# Patient Record
Sex: MALE | Race: BLACK OR AFRICAN AMERICAN | HISPANIC OR LATINO | Employment: FULL TIME | URBAN - METROPOLITAN AREA
[De-identification: names, ages, dates, MRNs, and addresses within clinical notes are randomized per-mention and may not be internally consistent; named-entity substitution may affect disease eponyms.]

---

## 2017-02-20 ENCOUNTER — LAB REQUISITION (OUTPATIENT)
Dept: LAB | Facility: HOSPITAL | Age: 57
End: 2017-02-20
Payer: COMMERCIAL

## 2017-02-20 ENCOUNTER — HOSPITAL ENCOUNTER (OUTPATIENT)
Dept: RADIOLOGY | Facility: CLINIC | Age: 57
Discharge: HOME | End: 2017-02-20
Payer: COMMERCIAL

## 2017-02-20 ENCOUNTER — ALLSCRIPTS OFFICE VISIT (OUTPATIENT)
Dept: OTHER | Facility: OTHER | Age: 57
End: 2017-02-20

## 2017-02-20 DIAGNOSIS — M25.462 EFFUSION OF LEFT KNEE: ICD-10-CM

## 2017-02-20 DIAGNOSIS — M25.571 PAIN IN RIGHT ANKLE: ICD-10-CM

## 2017-02-20 DIAGNOSIS — M25.562 PAIN IN LEFT KNEE: ICD-10-CM

## 2017-02-20 LAB
CRYSTALS SNV QL MICRO: NORMAL
LYMPHOCYTES # SNV MANUAL: 16 %
MONOCYTES NFR SNV MANUAL: 83 %
NEUTROPHILS NFR SNV MANUAL: 1 %
TOTAL CELLS COUNTED SPEC: 100
WBC # FLD MANUAL: 67 /UL

## 2017-02-20 PROCEDURE — 73562 X-RAY EXAM OF KNEE 3: CPT

## 2017-02-20 PROCEDURE — 73560 X-RAY EXAM OF KNEE 1 OR 2: CPT

## 2017-02-20 PROCEDURE — 87070 CULTURE OTHR SPECIMN AEROBIC: CPT | Performed by: PHYSICIAN ASSISTANT

## 2017-02-20 PROCEDURE — 89051 BODY FLUID CELL COUNT: CPT | Performed by: PHYSICIAN ASSISTANT

## 2017-02-20 PROCEDURE — 89060 EXAM SYNOVIAL FLUID CRYSTALS: CPT | Performed by: PHYSICIAN ASSISTANT

## 2017-02-20 PROCEDURE — 87205 SMEAR GRAM STAIN: CPT | Performed by: PHYSICIAN ASSISTANT

## 2017-02-23 LAB
BACTERIA SPEC BFLD CULT: NO GROWTH
GRAM STN SPEC: NORMAL
GRAM STN SPEC: NORMAL

## 2017-02-27 ENCOUNTER — ALLSCRIPTS OFFICE VISIT (OUTPATIENT)
Dept: OTHER | Facility: OTHER | Age: 57
End: 2017-02-27

## 2017-02-27 ENCOUNTER — HOSPITAL ENCOUNTER (OUTPATIENT)
Dept: RADIOLOGY | Facility: CLINIC | Age: 57
Discharge: HOME | End: 2017-02-27
Payer: COMMERCIAL

## 2017-02-27 DIAGNOSIS — M25.571 PAIN IN RIGHT ANKLE: ICD-10-CM

## 2017-02-27 PROCEDURE — 73630 X-RAY EXAM OF FOOT: CPT

## 2017-02-27 PROCEDURE — 73610 X-RAY EXAM OF ANKLE: CPT

## 2018-01-12 VITALS
SYSTOLIC BLOOD PRESSURE: 136 MMHG | WEIGHT: 240 LBS | HEIGHT: 70 IN | HEART RATE: 98 BPM | DIASTOLIC BLOOD PRESSURE: 89 MMHG | BODY MASS INDEX: 34.36 KG/M2

## 2018-01-14 VITALS
HEIGHT: 70 IN | WEIGHT: 240 LBS | HEART RATE: 92 BPM | BODY MASS INDEX: 34.36 KG/M2 | DIASTOLIC BLOOD PRESSURE: 86 MMHG | SYSTOLIC BLOOD PRESSURE: 121 MMHG

## 2021-03-27 ENCOUNTER — IMMUNIZATIONS (OUTPATIENT)
Dept: FAMILY MEDICINE CLINIC | Facility: HOSPITAL | Age: 61
End: 2021-03-27

## 2021-03-27 DIAGNOSIS — Z23 ENCOUNTER FOR IMMUNIZATION: Primary | ICD-10-CM

## 2021-03-27 PROCEDURE — 0001A SARS-COV-2 / COVID-19 MRNA VACCINE (PFIZER-BIONTECH) 30 MCG: CPT

## 2021-03-27 PROCEDURE — 91300 SARS-COV-2 / COVID-19 MRNA VACCINE (PFIZER-BIONTECH) 30 MCG: CPT

## 2021-04-17 ENCOUNTER — IMMUNIZATIONS (OUTPATIENT)
Dept: FAMILY MEDICINE CLINIC | Facility: HOSPITAL | Age: 61
End: 2021-04-17

## 2021-04-17 DIAGNOSIS — Z23 ENCOUNTER FOR IMMUNIZATION: Primary | ICD-10-CM

## 2021-04-17 PROCEDURE — 91300 SARS-COV-2 / COVID-19 MRNA VACCINE (PFIZER-BIONTECH) 30 MCG: CPT

## 2021-04-17 PROCEDURE — 0002A SARS-COV-2 / COVID-19 MRNA VACCINE (PFIZER-BIONTECH) 30 MCG: CPT

## 2021-10-24 ENCOUNTER — HOSPITAL ENCOUNTER (EMERGENCY)
Facility: HOSPITAL | Age: 61
Discharge: HOME | End: 2021-10-24
Attending: EMERGENCY MEDICINE
Payer: COMMERCIAL

## 2021-10-24 ENCOUNTER — APPOINTMENT (EMERGENCY)
Dept: RADIOLOGY | Facility: HOSPITAL | Age: 61
End: 2021-10-24
Payer: COMMERCIAL

## 2021-10-24 VITALS
BODY MASS INDEX: 35.07 KG/M2 | SYSTOLIC BLOOD PRESSURE: 138 MMHG | HEIGHT: 70 IN | DIASTOLIC BLOOD PRESSURE: 81 MMHG | WEIGHT: 245 LBS | TEMPERATURE: 98.4 F | HEART RATE: 82 BPM | OXYGEN SATURATION: 98 % | RESPIRATION RATE: 18 BRPM

## 2021-10-24 DIAGNOSIS — M54.50 ACUTE LEFT-SIDED LOW BACK PAIN WITHOUT SCIATICA: Primary | ICD-10-CM

## 2021-10-24 LAB
BILIRUB UR QL STRIP: NEGATIVE
CLARITY UR: CLEAR
COLOR UR: YELLOW
GLUCOSE UR STRIP-MCNC: NEGATIVE MG/DL
HGB UR QL STRIP.AUTO: NEGATIVE
KETONES UR STRIP-MCNC: ABNORMAL MG/DL
LEUKOCYTE ESTERASE UR QL STRIP: NEGATIVE
NITRITE UR QL STRIP: NEGATIVE
PH UR STRIP.AUTO: 5.5 [PH] (ref 4.5–8)
PROT UR STRIP-MCNC: NEGATIVE MG/DL
SP GR UR STRIP.AUTO: >=1.03 (ref 1–1.03)
UROBILINOGEN UR QL STRIP.AUTO: 1 E.U./DL

## 2021-10-24 PROCEDURE — 99284 EMERGENCY DEPT VISIT MOD MDM: CPT | Performed by: EMERGENCY MEDICINE

## 2021-10-24 PROCEDURE — 96372 THER/PROPH/DIAG INJ SC/IM: CPT

## 2021-10-24 PROCEDURE — 99284 EMERGENCY DEPT VISIT MOD MDM: CPT

## 2021-10-24 PROCEDURE — 81003 URINALYSIS AUTO W/O SCOPE: CPT

## 2021-10-24 PROCEDURE — 72100 X-RAY EXAM L-S SPINE 2/3 VWS: CPT

## 2021-10-24 RX ORDER — KETOROLAC TROMETHAMINE 30 MG/ML
15 INJECTION, SOLUTION INTRAMUSCULAR; INTRAVENOUS ONCE
Status: COMPLETED | OUTPATIENT
Start: 2021-10-24 | End: 2021-10-24

## 2021-10-24 RX ORDER — NAPROXEN 500 MG/1
500 TABLET ORAL 2 TIMES DAILY PRN
Qty: 20 TABLET | Refills: 0 | Status: SHIPPED | OUTPATIENT
Start: 2021-10-24

## 2021-10-24 RX ADMIN — KETOROLAC TROMETHAMINE 15 MG: 30 INJECTION, SOLUTION INTRAMUSCULAR at 20:18

## 2021-11-11 ENCOUNTER — HOSPITAL ENCOUNTER (OUTPATIENT)
Dept: ULTRASOUND IMAGING | Facility: HOSPITAL | Age: 61
Discharge: HOME | End: 2021-11-11
Payer: COMMERCIAL

## 2021-11-11 DIAGNOSIS — N20.0 RENAL CALCULI: ICD-10-CM

## 2021-11-11 PROCEDURE — 76770 US EXAM ABDO BACK WALL COMP: CPT

## 2022-11-20 ENCOUNTER — APPOINTMENT (EMERGENCY)
Dept: VASCULAR ULTRASOUND | Facility: HOSPITAL | Age: 62
End: 2022-11-20

## 2022-11-20 ENCOUNTER — APPOINTMENT (EMERGENCY)
Dept: RADIOLOGY | Facility: HOSPITAL | Age: 62
End: 2022-11-20

## 2022-11-20 ENCOUNTER — HOSPITAL ENCOUNTER (EMERGENCY)
Facility: HOSPITAL | Age: 62
Discharge: HOME | End: 2022-11-20
Attending: EMERGENCY MEDICINE

## 2022-11-20 VITALS
HEART RATE: 76 BPM | RESPIRATION RATE: 18 BRPM | DIASTOLIC BLOOD PRESSURE: 63 MMHG | SYSTOLIC BLOOD PRESSURE: 123 MMHG | OXYGEN SATURATION: 99 % | TEMPERATURE: 98.9 F | HEIGHT: 70 IN | WEIGHT: 245 LBS | BODY MASS INDEX: 35.07 KG/M2

## 2022-11-20 DIAGNOSIS — M25.562 ACUTE PAIN OF LEFT KNEE: ICD-10-CM

## 2022-11-20 DIAGNOSIS — S83.92XA LEFT KNEE SPRAIN: Primary | ICD-10-CM

## 2022-11-20 LAB
ALBUMIN SERPL BCP-MCNC: 4.4 G/DL (ref 3.5–5)
ALP SERPL-CCNC: 109 U/L (ref 34–104)
ALT SERPL W P-5'-P-CCNC: 21 U/L (ref 7–52)
ANION GAP SERPL CALCULATED.3IONS-SCNC: 10 MMOL/L (ref 4–13)
APTT PPP: 27 SECONDS (ref 23–37)
AST SERPL W P-5'-P-CCNC: 14 U/L (ref 13–39)
BASOPHILS # BLD AUTO: 0.01 THOUSANDS/ÂΜL (ref 0–0.1)
BASOPHILS NFR BLD AUTO: 0 % (ref 0–1)
BILIRUB SERPL-MCNC: 0.37 MG/DL (ref 0.2–1)
BUN SERPL-MCNC: 18 MG/DL (ref 5–25)
CALCIUM SERPL-MCNC: 9.5 MG/DL (ref 8.4–10.2)
CHLORIDE SERPL-SCNC: 102 MMOL/L (ref 96–108)
CK SERPL-CCNC: 141 U/L (ref 39–308)
CO2 SERPL-SCNC: 24 MMOL/L (ref 21–32)
CREAT SERPL-MCNC: 0.87 MG/DL (ref 0.6–1.3)
D DIMER PPP FEU-MCNC: 0.55 UG/ML FEU
EOSINOPHIL # BLD AUTO: 0.13 THOUSAND/ÂΜL (ref 0–0.61)
EOSINOPHIL NFR BLD AUTO: 2 % (ref 0–6)
ERYTHROCYTE [DISTWIDTH] IN BLOOD BY AUTOMATED COUNT: 13.4 % (ref 11.6–15.1)
GFR SERPL CREATININE-BSD FRML MDRD: 93 ML/MIN/1.73SQ M
GLUCOSE SERPL-MCNC: 200 MG/DL (ref 65–140)
HCT VFR BLD AUTO: 36.5 % (ref 36.5–49.3)
HGB BLD-MCNC: 12.4 G/DL (ref 12–17)
IMM GRANULOCYTES # BLD AUTO: 0.02 THOUSAND/UL (ref 0–0.2)
IMM GRANULOCYTES NFR BLD AUTO: 0 % (ref 0–2)
INR PPP: 1.01 (ref 0.84–1.19)
LYMPHOCYTES # BLD AUTO: 2.14 THOUSANDS/ÂΜL (ref 0.6–4.47)
LYMPHOCYTES NFR BLD AUTO: 33 % (ref 14–44)
MCH RBC QN AUTO: 29.7 PG (ref 26.8–34.3)
MCHC RBC AUTO-ENTMCNC: 34 G/DL (ref 31.4–37.4)
MCV RBC AUTO: 87 FL (ref 82–98)
MONOCYTES # BLD AUTO: 0.48 THOUSAND/ÂΜL (ref 0.17–1.22)
MONOCYTES NFR BLD AUTO: 8 % (ref 4–12)
NEUTROPHILS # BLD AUTO: 3.63 THOUSANDS/ÂΜL (ref 1.85–7.62)
NEUTS SEG NFR BLD AUTO: 57 % (ref 43–75)
NRBC BLD AUTO-RTO: 0 /100 WBCS
PLATELET # BLD AUTO: 286 THOUSANDS/UL (ref 149–390)
PMV BLD AUTO: 8.4 FL (ref 8.9–12.7)
POTASSIUM SERPL-SCNC: 3.6 MMOL/L (ref 3.5–5.3)
PROT SERPL-MCNC: 8.2 G/DL (ref 6.4–8.4)
PROTHROMBIN TIME: 13.5 SECONDS (ref 11.6–14.5)
RBC # BLD AUTO: 4.18 MILLION/UL (ref 3.88–5.62)
SODIUM SERPL-SCNC: 136 MMOL/L (ref 135–147)
URATE SERPL-MCNC: 7.5 MG/DL (ref 3.5–8.5)
WBC # BLD AUTO: 6.41 THOUSAND/UL (ref 4.31–10.16)

## 2022-11-20 RX ORDER — AMLODIPINE BESYLATE 2.5 MG/1
TABLET ORAL
COMMUNITY
Start: 2017-02-20

## 2022-11-20 RX ORDER — OXYCODONE HYDROCHLORIDE AND ACETAMINOPHEN 5; 325 MG/1; MG/1
1 TABLET ORAL EVERY 4 HOURS PRN
Qty: 20 TABLET | Refills: 0 | Status: SHIPPED | OUTPATIENT
Start: 2022-11-20 | End: 2022-11-24

## 2022-11-20 RX ORDER — ACETAMINOPHEN 325 MG/1
975 TABLET ORAL ONCE
Status: COMPLETED | OUTPATIENT
Start: 2022-11-20 | End: 2022-11-20

## 2022-11-20 RX ORDER — ATORVASTATIN CALCIUM 10 MG/1
TABLET, FILM COATED ORAL
COMMUNITY
Start: 2017-02-20

## 2022-11-20 RX ORDER — KETOROLAC TROMETHAMINE 30 MG/ML
15 INJECTION, SOLUTION INTRAMUSCULAR; INTRAVENOUS ONCE
Status: COMPLETED | OUTPATIENT
Start: 2022-11-20 | End: 2022-11-20

## 2022-11-20 RX ORDER — RAMIPRIL 1.25 MG/1
CAPSULE ORAL DAILY
COMMUNITY
Start: 2017-02-20

## 2022-11-20 RX ADMIN — KETOROLAC TROMETHAMINE 15 MG: 30 INJECTION, SOLUTION INTRAMUSCULAR at 02:22

## 2022-11-20 RX ADMIN — ACETAMINOPHEN 975 MG: 325 TABLET ORAL at 02:20

## 2022-11-20 NOTE — ED PROVIDER NOTES
History  Chief Complaint   Patient presents with   • Knee Pain     Patient reports x1 day left knee pain and left leg swelling - reports x2 days of >20hrs flying and increased activity  Taking 1000mg Tylenol w/o relief  63year old male presents for evaluation for worsening of his chronic knee pain  Patient reports history of left knee pain for the past few years  Patient states that yesterday he [place] walking in airports while he was [place] and believes this may have exacerbated his pain  No specific trauma or injuries  Patient states he took two different flights yesterday, both lasting around 3-4 hours each  Patient also believes that left knee is more swollen when compared to the right side  Patient denies fevers, chills, chest pain, shortness of breath, abdominal pain, nausea, vomiting or any other concerns  Patient has no history of venous thromboembolism, no known cancer history, no recent surgeries  Patient is not on anticoagulation  Knee Pain  Associated symptoms: no back pain and no fever        Prior to Admission Medications   Prescriptions Last Dose Informant Patient Reported? Taking? amLODIPine (NORVASC) 2 5 mg tablet   Yes Yes   Sig: Take by mouth   atorvastatin (LIPITOR) 10 mg tablet   Yes Yes   Sig: Take by mouth   ramipril (ALTACE) 1 25 mg capsule   Yes Yes   Sig: Take by mouth in the morning      Facility-Administered Medications: None       Past Medical History:   Diagnosis Date   • Diabetes mellitus ([facility] [address] )    • Hypertension        History reviewed  No pertinent surgical history  History reviewed  No pertinent family history  I have reviewed and agree with the history as documented      E-Cigarette/Vaping     E-Cigarette/Vaping Substances     Social History     Tobacco Use   • Smoking status: Never   • Smokeless tobacco: Never   Substance Use Topics   • Alcohol use: Not Currently   • Drug use: Never        Review of Systems   Constitutional: Negative for chills and fever    HENT: Negative for ear pain and sore throat  Eyes: Negative for pain and visual disturbance  Respiratory: Negative for cough and shortness of breath  Cardiovascular: Negative for chest pain and palpitations  Gastrointestinal: Negative for abdominal pain and vomiting  Genitourinary: Negative for dysuria and hematuria  Musculoskeletal: Negative for arthralgias and back pain  Left knee pain and swelling    Skin: Negative for color change and rash  Neurological: Negative for seizures and syncope  All other systems reviewed and are negative  Physical Exam  ED Triage Vitals [11/20/22 0123]   Temperature Pulse Respirations Blood Pressure SpO2   98 9 °F (37 2 °C) 90 18 (!) 179/92 99 %      Temp Source Heart Rate Source Patient Position - Orthostatic VS BP Location FiO2 (%)   Oral Monitor Sitting Right arm --      Pain Score       10 - Worst Possible Pain             Orthostatic Vital Signs  Vitals:    11/20/22 0123 11/20/22 0524   BP: (!) 179/92 123/63   Pulse: 90 76   Patient Position - Orthostatic VS: Sitting Lying       Physical Exam  Vitals and nursing note reviewed  Constitutional:       General: He is not in acute distress  Appearance: Normal appearance  He is well-developed  He is not ill-appearing, toxic-appearing or diaphoretic  HENT:      Head: Normocephalic and atraumatic  Mouth/Throat:      Mouth: Mucous membranes are moist    Eyes:      Conjunctiva/sclera: Conjunctivae normal    Cardiovascular:      Rate and Rhythm: Normal rate and regular rhythm  Heart sounds: No murmur heard  Pulmonary:      Effort: Pulmonary effort is normal  No respiratory distress  Breath sounds: Normal breath sounds  Abdominal:      Palpations: Abdomen is soft  Tenderness: There is no abdominal tenderness  Musculoskeletal:         General: No swelling  Cervical back: Neck supple        Comments: No tenderness overlying left knee, no significant swelling or effusion, no overlying skin changes to suggest cellulitis, full passive range of motion but patient reports painful range of motion, no crepitus, neurovascularly intact with 2+ DP pulses, no calf tenderness    Skin:     General: Skin is warm and dry  Capillary Refill: Capillary refill takes less than 2 seconds  Neurological:      Mental Status: He is alert  Psychiatric:         Mood and Affect: Mood normal          ED Medications  Medications   acetaminophen (TYLENOL) tablet 975 mg (975 mg Oral Given 11/20/22 0220)   ketorolac (TORADOL) injection 15 mg (15 mg Intravenous Given 11/20/22 0222)       Diagnostic Studies  Results Reviewed     Procedure Component Value Units Date/Time    D-Dimer [[id number]]  (Abnormal) Collected: 11/20/22 0220    Lab Status: Final result Specimen: Blood from Arm, Left Updated: 11/20/22 0300     D-Dimer, Quant 0 55 ug/ml FEU     Narrative: In the evaluation for possible pulmonary embolism, in the appropriate (Well's Score of 4 or less) patient, the age adjusted d-dimer cutoff for this patient can be calculated as:    Age x 0 01 (in ug/mL) for Age-adjusted D-dimer exclusion threshold for a patient over 50 years      Uric acid [[id number]]  (Normal) Collected: 11/20/22 0220    Lab Status: Final result Specimen: Blood from Arm, Left Updated: 11/20/22 0254     Uric Acid 7 5 mg/dL     CK Total with Reflex CKMB [[id number]]  (Normal) Collected: 11/20/22 0220    Lab Status: Final result Specimen: Blood from Arm, Left Updated: 11/20/22 0253     Total  U/L     Comprehensive metabolic panel [[id number]]  (Abnormal) Collected: 11/20/22 0220    Lab Status: Final result Specimen: Blood from Arm, Left Updated: 11/20/22 0253     Sodium 136 mmol/L      Potassium 3 6 mmol/L      Chloride 102 mmol/L      CO2 24 mmol/L      ANION GAP 10 mmol/L      BUN 18 mg/dL      Creatinine 0 87 mg/dL      Glucose 200 mg/dL      Calcium 9 5 mg/dL      AST 14 U/L      ALT 21 U/L      Alkaline Phosphatase 109 U/L      Total Protein 8 2 g/dL      Albumin 4 4 g/dL      Total Bilirubin 0 37 mg/dL      eGFR 93 ml/min/1 73sq m     Narrative:      National Kidney Disease Foundation guidelines for Chronic Kidney Disease (CKD):   •  Stage 1 with normal or high GFR (GFR > 90 mL/min/1 73 square meters)  •  Stage 2 Mild CKD (GFR = 60-89 mL/min/1 73 square meters)  •  Stage 3A Moderate CKD (GFR = 45-59 mL/min/1 73 square meters)  •  Stage 3B Moderate CKD (GFR = 30-44 mL/min/1 73 square meters)  •  Stage 4 Severe CKD (GFR = 15-29 mL/min/1 73 square meters)  •  Stage 5 End Stage CKD (GFR <15 mL/min/1 73 square meters)  Note: GFR calculation is accurate only with a steady state creatinine    Protime-INR [[id number]]  (Normal) Collected: 11/20/22 0220    Lab Status: Final result Specimen: Blood from Arm, Left Updated: 11/20/22 0246     Protime 13 5 seconds      INR 1 01    APTT [[id number]]  (Normal) Collected: 11/20/22 0220    Lab Status: Final result Specimen: Blood from Arm, Left Updated: 11/20/22 0246     PTT 27 seconds     CBC and differential [[id number]]  (Abnormal) Collected: 11/20/22 0220    Lab Status: Final result Specimen: Blood from Arm, Left Updated: 11/20/22 0230     WBC 6 41 Thousand/uL      RBC 4 18 Million/uL      Hemoglobin 12 4 g/dL      Hematocrit 36 5 %      MCV 87 fL      MCH 29 7 pg      MCHC 34 0 g/dL      RDW 13 4 %      MPV 8 4 fL      Platelets 380 Thousands/uL      nRBC 0 /100 WBCs      Neutrophils Relative 57 %      Immat GRANS % 0 %      Lymphocytes Relative 33 %      Monocytes Relative 8 %      Eosinophils Relative 2 %      Basophils Relative 0 %      Neutrophils Absolute 3 63 Thousands/µL      Immature Grans Absolute 0 02 Thousand/uL      Lymphocytes Absolute 2 14 Thousands/µL      Monocytes Absolute 0 48 Thousand/µL      Eosinophils Absolute 0 13 Thousand/µL      Basophils Absolute 0 01 Thousands/µL                  VAS lower limb venous duplex study, unilateral/limited   ED Interpretation by George Olson, MD (11/20 5437)   Negative for DVT in the left leg as per vascular tech  No evidence of Baker's cyst        XR knee 4+ views left injury   ED Interpretation by Jacklynn Severs, MD (11/20 7570)   No fracture or dislocation            Procedures  Procedures      ED Course  ED Course as of 11/20/22 0804   Sun Nov 20, 2022   0405 Discussed with patient staying until the AM for ultrasound to arrive vs  Scheduling as outpatient  Patient requesting to stay in the ED to have venous duplex obtained in the AM                               SBIRT 20yo+    Flowsheet Row Most Recent Value   SBIRT (25 yo +)    In order to provide better care to our patients, we are screening all of our patients for alcohol and drug use  Would it be okay to ask you these screening questions?  Unable to answer at this time Filed at: 11/20/2022 0125            Leodan' Criteria for DVT    Flowsheet Row Most Recent Value   Leodan' Criteria for DVT    Active cancer Treatment or palliation within 6 months 0 Filed at: 11/20/2022 0602   Bedridden recently >3 days or major surgery within 12 weeks 0 Filed at: 11/20/2022 0602   Calf swelling >3 cm compared to the other leg 0 Filed at: 11/20/2022 0602   Entire leg swollen 0 Filed at: 11/20/2022 0602   Collateral (nonvaricose) superficial veins present 0 Filed at: 11/20/2022 0602   Localized tenderness along the deep venous system 0 Filed at: 11/20/2022 0602   Pitting edema, confined to symptomatic leg 0 Filed at: 11/20/2022 0602   Paralysis, paresis, or recent plaster immobilization of the lower extremity 0 Filed at: 11/20/2022 0602   Previously documented DVT 0 Filed at: 11/20/2022 0602   Alternative diagnosis to DVT as likely or more likely 0 Filed at: 11/20/2022 0602   Leodan DVT Critera Score 0 Filed at: 11/20/2022 0602          MDM  Number of Diagnoses or Management Options  Acute pain of left knee  Left knee sprain  Diagnosis management comments: 63year old male presents for evaluation of worsening left knee pain  Physical exam reassuring  Reviewed lab findings with patient  Recommended ultrasound to rule out DVT given recent air travel  Given that ultrasound is not available overnight, patient preferred to wait until the AM to obtain venous duplex  Ultrasound negative for DVT or Baker's cyst   Patient provided with knee immobilizer and crutches and recommended close ortho follow up  Reviewed return precautions  Amount and/or Complexity of Data Reviewed  Clinical lab tests: ordered and reviewed  Tests in the radiology section of CPT®: reviewed and ordered  Independent visualization of images, tracings, or specimens: yes    Risk of Complications, Morbidity, and/or Mortality  Presenting problems: moderate  Diagnostic procedures: moderate  Management options: low    Patient Progress  Patient progress: stable      Disposition  Final diagnoses:   Left knee sprain   Acute pain of left knee     Time reflects when diagnosis was documented in both MDM as applicable and the Disposition within this note     Time User Action Codes Description Comment    11/20/2022  7:50 AM Wally [place] Add Ora Nations  92XA] Left knee sprain     11/20/2022  7:53 AM Annie Delvalle Add [R27 [id number]] Acute pain of left knee       ED Disposition     ED Disposition   Discharge    Condition   Stable    Date/Time   Sun Nov 20, 2022  7:50 AM    Comment   Driss Hauser discharge to home/self care                 Follow-up Information     Follow up With Specialties Details Why Contact Info Additional [address] [facility] [place] [facility] Schedule an appointment as soon as possible for a visit   [address] [id number] [address] [facility] [place], [address] [facility] [address] [place], [place], [id number] [phone]          Patient's Medications   Discharge Prescriptions    OXYCODONE-ACETAMINOPHEN (PERCOCET) 5-325 MG PER TABLET    Take 1 tablet by mouth every 4 (four) hours as needed for moderate pain for up to 4 days Max Daily Amount: 6 tablets       Start Date: 11/20/2022End Date: 11/24/2022       Order Dose: 1 tablet       Quantity: 20 tablet    Refills: 0     No discharge procedures on file  PDMP Review       Value Time User    PDMP Reviewed  Yes 11/20/2022  1:23 AM Ricarda Fallon MD           ED Provider  Attending physically available and evaluated Driss Hauser  MABLE managed the patient along with the ED Attending      Electronically Signed by         Iris Batista MD  11/20/22 0641

## 2022-11-20 NOTE — ED ATTENDING ATTESTATION
11/20/2022  I, Roxene Goldmann, MD, saw and evaluated the patient  I have discussed the patient with the resident/non-physician practitioner and agree with the resident's/non-physician practitioner's findings, Plan of Care, and MDM as documented in the resident's/non-physician practitioner's note, except where noted  All available labs and Radiology studies were reviewed  I was present for key portions of any procedure(s) performed by the resident/non-physician practitioner and I was immediately available to provide assistance  At this point I agree with the current assessment done in the Emergency Department  I have conducted an independent evaluation of this patient a history and physical is as follows: Patient is a 63year old male with worsening left knee pain since yesterday  No trauma  (+) air travel recently  No fever  No sob  No cough  Was last seen in this ED on 10/24/21 for left lower back pain  [facility] website checked on this patient and no Rx found  NCAT  Mucous membranes moist  Lungs clear  Heart regular without murmur  Abdomen soft and nontender  No edema  No calf tenderness  No significant left knee tenderness (states it has pain with movement)  NVI  DDX including but not limited to: arthritis, bursitis, tendinitis, sprain, strain, fracture, meniscal tear, doubt cellulitis, osteomyelitis; gout, doubt Lyme disease; Baker's cyst, rheumatologic process; doubt septic arthritis or DVT or arterial occlusion  Will check labs and x-ray       ED Course         Critical Care Time  Procedures

## 2022-12-01 ENCOUNTER — APPOINTMENT (OUTPATIENT)
Dept: RADIOLOGY | Facility: AMBULARY SURGERY CENTER | Age: 62
End: 2022-12-01
Attending: ORTHOPAEDIC SURGERY

## 2022-12-01 ENCOUNTER — OFFICE VISIT (OUTPATIENT)
Dept: OBGYN CLINIC | Facility: CLINIC | Age: 62
End: 2022-12-01

## 2022-12-01 VITALS — HEIGHT: 70 IN | BODY MASS INDEX: 35.36 KG/M2 | WEIGHT: 247 LBS

## 2022-12-01 DIAGNOSIS — M17.12 PRIMARY OSTEOARTHRITIS OF LEFT KNEE: Primary | ICD-10-CM

## 2022-12-01 DIAGNOSIS — M25.561 ACUTE PAIN OF RIGHT KNEE: ICD-10-CM

## 2022-12-01 RX ORDER — CLOTRIMAZOLE AND BETAMETHASONE DIPROPIONATE 10; .64 MG/G; MG/G
CREAM TOPICAL
COMMUNITY
Start: 2022-10-13

## 2022-12-01 RX ORDER — NIFEDIPINE 90 MG/1
TABLET, FILM COATED, EXTENDED RELEASE ORAL
COMMUNITY
Start: 2022-10-13

## 2022-12-01 RX ORDER — MELOXICAM 15 MG/1
15 TABLET ORAL DAILY
Qty: 30 TABLET | Refills: 0 | Status: SHIPPED | OUTPATIENT
Start: 2022-12-01

## 2022-12-01 RX ORDER — VALSARTAN AND HYDROCHLOROTHIAZIDE 320; 25 MG/1; MG/1
1 TABLET, FILM COATED ORAL DAILY
COMMUNITY
Start: 2022-10-13

## 2022-12-01 RX ORDER — TRIAMCINOLONE ACETONIDE 40 MG/ML
20 INJECTION, SUSPENSION INTRA-ARTICULAR; INTRAMUSCULAR
Status: COMPLETED | OUTPATIENT
Start: 2022-12-01 | End: 2022-12-01

## 2022-12-01 RX ORDER — METOPROLOL SUCCINATE 100 MG/1
100 TABLET, EXTENDED RELEASE ORAL DAILY
COMMUNITY
Start: 2022-10-13

## 2022-12-01 RX ORDER — ROPIVACAINE HYDROCHLORIDE 5 MG/ML
10 INJECTION, SOLUTION EPIDURAL; INFILTRATION; PERINEURAL
Status: COMPLETED | OUTPATIENT
Start: 2022-12-01 | End: 2022-12-01

## 2022-12-01 RX ADMIN — TRIAMCINOLONE ACETONIDE 20 MG: 40 INJECTION, SUSPENSION INTRA-ARTICULAR; INTRAMUSCULAR at 09:25

## 2022-12-01 RX ADMIN — ROPIVACAINE HYDROCHLORIDE 10 ML: 5 INJECTION, SOLUTION EPIDURAL; INFILTRATION; PERINEURAL at 09:25

## 2022-12-15 ENCOUNTER — PROCEDURE VISIT (OUTPATIENT)
Dept: OBGYN CLINIC | Facility: CLINIC | Age: 62
End: 2022-12-15

## 2022-12-15 VITALS — WEIGHT: 247 LBS | HEIGHT: 70 IN | BODY MASS INDEX: 35.36 KG/M2

## 2022-12-15 DIAGNOSIS — M17.12 PRIMARY OSTEOARTHRITIS OF LEFT KNEE: Primary | ICD-10-CM

## 2022-12-15 NOTE — PROGRESS NOTES
1  Primary osteoarthritis of left knee  Large joint arthrocentesis: L knee        Patient is here for his 1st injection of orthovisc into the left knee  Patient reports left knee pain  Physical exam of the knee shows no effusion no ecchymosis  All other organ systems normal    Large joint arthrocentesis: L knee  Universal Protocol:  Consent given by: patient  Time out: Immediately prior to procedure a "time out" was called to verify the correct patient, procedure, equipment, support staff and site/side marked as required    Site marked: the operative site was marked  Supporting Documentation  Indications: pain   Procedure Details  Location: knee - L knee  Preparation: Patient was prepped and draped in the usual sterile fashion  Needle size: 22 G  Ultrasound guidance: no  Approach: anterolateral  Medications administered: 30 mg sodium hyaluronate 30 mg/2 mL    Patient tolerance: patient tolerated the procedure well with no immediate complications  Dressing:  Sterile dressing applied          Patient tolerated procedure follow up 1 week

## 2022-12-22 ENCOUNTER — PROCEDURE VISIT (OUTPATIENT)
Dept: OBGYN CLINIC | Facility: CLINIC | Age: 62
End: 2022-12-22

## 2022-12-22 VITALS
SYSTOLIC BLOOD PRESSURE: 120 MMHG | BODY MASS INDEX: 35.36 KG/M2 | DIASTOLIC BLOOD PRESSURE: 65 MMHG | WEIGHT: 247 LBS | HEIGHT: 70 IN | HEART RATE: 80 BPM

## 2022-12-22 DIAGNOSIS — M17.12 PRIMARY OSTEOARTHRITIS OF LEFT KNEE: Primary | ICD-10-CM

## 2022-12-22 NOTE — PROGRESS NOTES
1  Primary osteoarthritis of left knee  Large joint arthrocentesis: L knee        Patient is here for his second injection of the orthovisc into the left knee  Patient reports improvement  Physical exam of the knee shows no effusion no ecchymosis  All other organ systems normal    Large joint arthrocentesis: L knee  Universal Protocol:  Consent given by: patient  Time out: Immediately prior to procedure a "time out" was called to verify the correct patient, procedure, equipment, support staff and site/side marked as required    Site marked: the operative site was marked  Supporting Documentation  Indications: pain   Procedure Details  Location: knee - L knee  Preparation: Patient was prepped and draped in the usual sterile fashion  Needle size: 22 G  Ultrasound guidance: no  Approach: anterolateral  Medications administered: 30 mg sodium hyaluronate 30 mg/2 mL    Patient tolerance: patient tolerated the procedure well with no immediate complications  Dressing:  Sterile dressing applied          Patient tolerated procedure follow up 1 week

## 2022-12-29 ENCOUNTER — PROCEDURE VISIT (OUTPATIENT)
Dept: OBGYN CLINIC | Facility: CLINIC | Age: 62
End: 2022-12-29

## 2022-12-29 VITALS
HEIGHT: 70 IN | HEART RATE: 89 BPM | SYSTOLIC BLOOD PRESSURE: 123 MMHG | WEIGHT: 247 LBS | DIASTOLIC BLOOD PRESSURE: 80 MMHG | BODY MASS INDEX: 35.36 KG/M2

## 2022-12-29 DIAGNOSIS — M17.12 PRIMARY OSTEOARTHRITIS OF LEFT KNEE: Primary | ICD-10-CM

## 2022-12-29 RX ORDER — HYALURONATE SODIUM 10 MG/ML
20 SYRINGE (ML) INTRAARTICULAR
Status: DISCONTINUED | OUTPATIENT
Start: 2022-12-29 | End: 2022-12-30

## 2022-12-29 NOTE — PROGRESS NOTES
1  Primary osteoarthritis of left knee  Large joint arthrocentesis: L knee    Sodium Hyaluronate 20 mg        Patient is here for his 3rd injection of orthovisc into the left knee  Patient reports improvements  Physical exam of the knee shows no effusion no ecchymosis  All other organ systems normal    Large joint arthrocentesis: L knee  Universal Protocol:  Consent given by: patient  Time out: Immediately prior to procedure a "time out" was called to verify the correct patient, procedure, equipment, support staff and site/side marked as required    Site marked: the operative site was marked  Supporting Documentation  Indications: pain   Procedure Details  Location: knee - L knee  Preparation: Patient was prepped and draped in the usual sterile fashion  Needle size: 22 G  Ultrasound guidance: no  Approach: anterolateral  Medications administered: 30 mg sodium hyaluronate 30 mg/2 mL    Patient tolerance: patient tolerated the procedure well with no immediate complications  Dressing:  Sterile dressing applied          Patient tolerated procedure follow up PRN

## 2023-03-27 ENCOUNTER — APPOINTMENT (OUTPATIENT)
Dept: RADIOLOGY | Facility: AMBULARY SURGERY CENTER | Age: 63
End: 2023-03-27
Attending: ORTHOPAEDIC SURGERY

## 2023-03-27 ENCOUNTER — TELEPHONE (OUTPATIENT)
Dept: OBGYN CLINIC | Facility: HOSPITAL | Age: 63
End: 2023-03-27

## 2023-03-27 ENCOUNTER — OFFICE VISIT (OUTPATIENT)
Dept: OBGYN CLINIC | Facility: CLINIC | Age: 63
End: 2023-03-27

## 2023-03-27 VITALS
HEART RATE: 80 BPM | DIASTOLIC BLOOD PRESSURE: 83 MMHG | SYSTOLIC BLOOD PRESSURE: 122 MMHG | BODY MASS INDEX: 34.5 KG/M2 | WEIGHT: 241 LBS | HEIGHT: 70 IN

## 2023-03-27 DIAGNOSIS — M19.079 ARTHRITIS OF MIDFOOT: Primary | ICD-10-CM

## 2023-03-27 DIAGNOSIS — M21.42 PES PLANUS OF BOTH FEET: ICD-10-CM

## 2023-03-27 DIAGNOSIS — M79.671 PAIN IN RIGHT FOOT: ICD-10-CM

## 2023-03-27 DIAGNOSIS — M21.41 PES PLANUS OF BOTH FEET: ICD-10-CM

## 2023-03-27 PROBLEM — M21.40 FLAT FOOT: Status: ACTIVE | Noted: 2023-03-27

## 2023-03-27 RX ORDER — METHYLPREDNISOLONE 4 MG/1
TABLET ORAL
Qty: 1 EACH | Refills: 0 | Status: SHIPPED | OUTPATIENT
Start: 2023-03-27

## 2023-03-27 NOTE — TELEPHONE ENCOUNTER
Caller: patient    Doctor: renard    Reason for call: patient called to check for medication that was submitted    Call back#: n/a

## 2023-03-27 NOTE — PROGRESS NOTES
Assessment:  1  Arthritis of midfoot  methylPREDNISolone 4 MG tablet therapy pack    Ambulatory Referral to Physical Therapy      2  Pain in right foot  XR foot 3+ vw right      3  Pes planus of both feet  methylPREDNISolone 4 MG tablet therapy pack    Ambulatory Referral to Physical Therapy        Patient Active Problem List   Diagnosis   • Primary osteoarthritis of left knee   • Arthritis of midfoot   • Flat foot       Plan:    63 y o  male with right foot pain secondary to midfoot arthritis at the articulation of the fifth metatarsal base and cuboid    • Medrol Dosepak prescribed for pain relief  • Referral to [facility] of  for fabrication of custom orthotics  • He also has a bunion on the right foot he will let us know if he desires any intervention for this we can send him to Dr Jules Carrizales  • Follow-up as needed        The patient was seen and examined by Dr Darnell Guerin and myself  The assessment and plan were formulated by Dr Darnell Guerin and I assisted in carrying it out  Subjective:   Patient ID: Irasema Costello is a 63 y o  male   HPI    Patient comes in today with regards to right foot pain  Patient is referred to us by Marie Simmons for further evaluation  The patient reports that the pain been going on for 2 weeks  Injury or trauma prior to onset of pain: None  Pain is located in the dorsal lateral right foot  It is worsened with weightbearing, and is made better with rest   Treatments tried: Tylenol meloxicam The pain does not radiate  Old injuries or prior surgeries: None  Numbness or tingling: Denies        The following portions of the patient's history were reviewed and updated as appropriate: allergies, current medications, past family history, past social history, past surgical history and problem list     Social History     Socioeconomic History   • Marital status: /Civil Union     Spouse name: Not on file   • Number of children: Not on file   • Years of education: Not on file   • Highest education level: Not on file   Occupational History   • Not on file   Tobacco Use   • Smoking status: Never   • Smokeless tobacco: Never   Substance and Sexual Activity   • Alcohol use: Not Currently   • Drug use: Never   • Sexual activity: Not on file   Other Topics Concern   • Not on file   Social History Narrative   • Not on file     Social Determinants of Health     Financial Resource Strain: Not on file   Food Insecurity: Not on file   Transportation Needs: Not on file   Physical Activity: Not on file   Stress: Not on file   Social Connections: Not on file   Intimate Partner Violence: Not on file   Housing Stability: Not on file     Past Medical History:   Diagnosis Date   • Diabetes mellitus ([facility] [place] [address] )    • Hypertension      History reviewed  No pertinent surgical history  No Known Allergies  Current Outpatient Medications on File Prior to Visit   Medication Sig Dispense Refill   • amLODIPine (NORVASC) 2 5 mg tablet Take by mouth     • atorvastatin (LIPITOR) 10 mg tablet Take by mouth     • clotrimazole-betamethasone (LOTRISONE) 1-0 05 % cream APPLY 1 APPLICATION EXTERNALLY TWICE A DAY FOR 90 DAYS     • meloxicam (Mobic) 15 mg tablet Take 1 tablet (15 mg total) by mouth daily 30 tablet 0   • metFORMIN (GLUCOPHAGE) 1000 MG tablet TAKE 1 TABLET WITH A MEAL BY MOUTH TWICE A DAY     • metoprolol succinate (TOPROL-XL) 100 mg 24 hr tablet Take 100 mg by mouth daily     • NIFEdipine (ADALAT CC) 90 mg 24 hr tablet TAKE 1 TABLET BY MOUTH EVERY DAY ON EMPTY STOMACH FOR 90 DAYS     • ramipril (ALTACE) 1 25 mg capsule Take by mouth in the morning     • valsartan-hydrochlorothiazide (DIOVAN-HCT) 320-25 MG per tablet Take 1 tablet by mouth daily       No current facility-administered medications on file prior to visit  Review of Systems   Constitutional: Negative for chills, fever and unexpected weight change  HENT: Negative for hearing loss, nosebleeds and sore throat      Eyes: Negative for pain, redness and visual disturbance  Respiratory: Negative for cough, shortness of breath and wheezing  Cardiovascular: Negative for chest pain, palpitations and leg swelling  Gastrointestinal: Negative for abdominal pain, nausea and vomiting  Endocrine: Negative for polydipsia and polyuria  Genitourinary: Negative for dysuria and hematuria  Musculoskeletal:          As noted in HPI   Skin: Negative for rash and wound  Neurological: Negative for dizziness, numbness and headaches  Psychiatric/Behavioral: Negative for decreased concentration, dysphoric mood and suicidal ideas  The patient is not nervous/anxious  Objective:    Vitals:    03/27/23 1550   BP: 122/83   Pulse: 80       Physical Exam  Constitutional:       General: He is not in acute distress  Appearance: He is well-developed  HENT:      Head: Normocephalic and atraumatic  Eyes:      General: No scleral icterus  Conjunctiva/sclera: Conjunctivae normal    Neck:      Trachea: No tracheal deviation  Cardiovascular:      Comments: No discernible arrhthymias   Pulmonary:      Effort: Pulmonary effort is normal  No respiratory distress  Musculoskeletal:      Cervical back: Neck supple  Skin:     General: Skin is warm and dry  Neurological:      Mental Status: He is alert  Psychiatric:         Behavior: Behavior normal          Right Ankle Exam     Tenderness   Right ankle tenderness location: Tenderness over the articulation of the fifth metatarsal base and cuboid  Swelling: mild    Range of Motion   The patient has normal right ankle ROM  Muscle Strength   The patient has normal right ankle strength      Other   Erythema: absent  Scars: absent  Sensation: normal  Pulse: present     Comments:  Pes planus and hallux valgus are evident  No pain with tuning fork over the fifth metatarsal base            I have personally reviewed pertinent films in PACS and my interpretation is X-ray of the right foot done today shows no acute "fracture, pes planus seen on this weightbearing x-ray, there is arthritic changes in the midfoot  Small calcaneal spurs    Procedures  No Procedures performed today    Scribe Attestation    I,:  Salvador Haro PA-C am acting as a scribe while in the presence of the attending physician :       I,:  Gregory Mcintosh, DO personally performed the services described in this documentation    as scribed in my presence :             Portions of the record may have been created with voice recognition software  Occasional wrong word or \"sound a like\" substitutions may have occurred due to the inherent limitations of voice recognition software  Read the chart carefully and recognize, using context, where substitutions have occurred    "

## 2023-11-29 ENCOUNTER — OFFICE VISIT (OUTPATIENT)
Dept: DERMATOLOGY | Facility: CLINIC | Age: 63
End: 2023-11-29
Payer: COMMERCIAL

## 2023-11-29 VITALS — BODY MASS INDEX: 35.65 KG/M2 | HEIGHT: 70 IN | WEIGHT: 249 LBS | TEMPERATURE: 98.3 F

## 2023-11-29 DIAGNOSIS — L43.9 LICHEN PLANUS: ICD-10-CM

## 2023-11-29 DIAGNOSIS — L82.1 SEBORRHEIC KERATOSIS: ICD-10-CM

## 2023-11-29 DIAGNOSIS — L81.0 POST-INFLAMMATORY HYPERPIGMENTATION: ICD-10-CM

## 2023-11-29 DIAGNOSIS — L29.9 PRURITUS: Primary | ICD-10-CM

## 2023-11-29 PROCEDURE — 99204 OFFICE O/P NEW MOD 45 MIN: CPT | Performed by: DERMATOLOGY

## 2023-11-29 RX ORDER — TRIAMCINOLONE ACETONIDE 1 MG/G
OINTMENT TOPICAL
Qty: 454 G | Refills: 0 | Status: SHIPPED | OUTPATIENT
Start: 2023-11-29

## 2023-11-29 NOTE — PROGRESS NOTES
[facility] Note     Patient Name: Anton Martins  Encounter Date: 11/29/2023     Have you been cared for by a [facility]atologist in the last 3 years and, if so, which description applies to you? NO. I am considered a "new" patient and must complete all patient intake questions. I am MALE/not capable of bearing children. REVIEW OF SYSTEMS:  Have you recently had or currently have any of the following? Recent fever or chills? No  Any non-healing wound? No   PAST MEDICAL HISTORY:  Have you personally ever had or currently have any of the following? If "YES," then please provide more detail. Skin cancer (such as Melanoma, Basal Cell Carcinoma, Squamous Cell Carcinoma? No  Tuberculosis, HIV/AIDS, Hepatitis B or C: No  Radiation Treatment No   HISTORY OF IMMUNOSUPPRESSION:   Do you have a history of any of the following:  Systemic Immunosuppression such as Diabetes, Biologic or Immunotherapy, Chemotherapy, Organ Transplantation, Bone Marrow Transplantation? YES, Diabetes     Answering "YES" requires the addition of the dotphrase "IMMUNOSUPPRESSED" as the first diagnosis of the patient's visit. FAMILY HISTORY:  Any "first degree relatives" (parent, brother, sister, or child) with the following? Skin Cancer, Pancreatic or Other Cancer? No   PATIENT EXPERIENCE:    Do you want the Dermatologist to perform a COMPLETE skin exam today including a clinical examination under the "bra and underwear" areas? Yes  If necessary, do we have your permission to call and leave a detailed message on your Preferred Phone number that includes your specific medical information?   Yes      No Known Allergies   Current Outpatient Medications:     amLODIPine (NORVASC) 2.5 mg tablet, Take by mouth, Disp: , Rfl:     atorvastatin (LIPITOR) 10 mg tablet, Take by mouth, Disp: , Rfl:     clotrimazole-betamethasone (LOTRISONE) 1-0.05 % cream, APPLY 1 APPLICATION EXTERNALLY TWICE A DAY FOR 90 DAYS, Disp: , Rfl: meloxicam (Mobic) 15 mg tablet, Take 1 tablet (15 mg total) by mouth daily, Disp: 30 tablet, Rfl: 0    metFORMIN (GLUCOPHAGE) 1000 MG tablet, TAKE 1 TABLET WITH A MEAL BY MOUTH TWICE A DAY, Disp: , Rfl:     metoprolol succinate (TOPROL-XL) 100 mg 24 hr tablet, Take 100 mg by mouth daily, Disp: , Rfl:     NIFEdipine (ADALAT CC) 90 mg 24 hr tablet, TAKE 1 TABLET BY MOUTH EVERY DAY ON EMPTY STOMACH FOR 90 DAYS, Disp: , Rfl:     ramipril (ALTACE) 1.25 mg capsule, Take by mouth in the morning, Disp: , Rfl:     valsartan-hydrochlorothiazide (DIOVAN-HCT) 320-25 MG per tablet, Take 1 tablet by mouth daily, Disp: , Rfl:     methylPREDNISolone 4 MG tablet therapy pack, Use as directed on package (Patient not taking: Reported on 11/29/2023), Disp: 1 each, Rfl: 0          Whom besides the patient is providing clinical information about today's encounter? 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 Patient stated he started a new medication and then he noticed he started to have a rash after one month of taking medication. He has since stopped but rash is still present.      Assessment and Plan:  Based on a thorough discussion of this condition and the management approach to it (including a comprehensive discussion of the known risks, side effects and potential benefits of treatment), the patient (family) agrees to implement the following specific plan:    Start using kenalog  0.1% ointment twice a day for one month  Contact office if rash is not clearing up  I did confirm with the patient that he has notified the physician who started the rubelsus   One year exam scheduled     POST-INFLAMMATORY HYPERPIGMENTATION 2/2 LP   Physical Exam:  Anatomic Location Affected:  trunk and extremities   Morphological Description:  hyperpigmented macules and patches       Additional History of Present Condition:  asymptomatic; using OTC creams     Assessment and Plan:  Based on a thorough discussion of this condition and the management approach to it (including a comprehensive discussion of the known risks, side effects and potential benefits of treatment), the patient (family) agrees to implement the following specific plan:  Counseled patient that this will resolve with time and can take several months. This is an expected course of the condition. Counseled about regular SPF 30 or higher use to prevent further darkening. SEBORRHEIC KERATOSIS; NON-INFLAMED     Physical Exam:  Anatomic Location Affected:  Trunk and extremities  Morphological Description:  Waxy, smooth to warty textured, yellow to brownish-grey to dark brown to blackish, discrete, "stuck-on" appearing papules. Present for years. Denies pain, itch, bleeding. Additional History of Present Condition:  Present constantly; no modifying factors which make it worse or better. No prior treatment. Assessment and Plan:  Based on a thorough discussion of this condition and the management approach to it (including a comprehensive discussion of the known risks, side effects and potential benefits of treatment), the patient (family) agrees to implement the following specific plan:  Reassure benign  Use sun protection. Apply SPF 30 or higher at least three times a day. Wear sun protecting clothing and hats.          Scribe Attestation      I,:  Carmen Mckenna MA am acting as a scribe while in the presence of the attending physician.:       I,:  Pascual Mensah MD personally performed the services described in this documentation    as scribed in my presence.:

## 2023-11-29 NOTE — PATIENT INSTRUCTIONS
Assessment and Plan:  Based on a thorough discussion of this condition and the management approach to it (including a comprehensive discussion of the known risks, side effects and potential benefits of treatment), the patient (family) agrees to implement the following specific plan:    Start using kenalog  0.1% ointment twice a day for one month  Contact office if rash is not clearing up  One year exam scheduled

## 2024-09-04 ENCOUNTER — APPOINTMENT (OUTPATIENT)
Dept: RADIOLOGY | Facility: AMBULARY SURGERY CENTER | Age: 64
End: 2024-09-04
Attending: ORTHOPAEDIC SURGERY
Payer: COMMERCIAL

## 2024-09-04 ENCOUNTER — OFFICE VISIT (OUTPATIENT)
Dept: OBGYN CLINIC | Facility: CLINIC | Age: 64
End: 2024-09-04
Payer: COMMERCIAL

## 2024-09-04 VITALS
DIASTOLIC BLOOD PRESSURE: 79 MMHG | WEIGHT: 249 LBS | SYSTOLIC BLOOD PRESSURE: 123 MMHG | HEART RATE: 75 BPM | BODY MASS INDEX: 35.65 KG/M2 | HEIGHT: 70 IN

## 2024-09-04 DIAGNOSIS — E11.628 TYPE 2 DIABETES MELLITUS WITH OTHER SKIN COMPLICATIONS (HCC): ICD-10-CM

## 2024-09-04 DIAGNOSIS — M17.12 PRIMARY OSTEOARTHRITIS OF LEFT KNEE: Primary | ICD-10-CM

## 2024-09-04 DIAGNOSIS — M25.562 LEFT KNEE PAIN, UNSPECIFIED CHRONICITY: ICD-10-CM

## 2024-09-04 PROCEDURE — 73562 X-RAY EXAM OF KNEE 3: CPT

## 2024-09-04 PROCEDURE — 99213 OFFICE O/P EST LOW 20 MIN: CPT | Performed by: ORTHOPAEDIC SURGERY

## 2024-09-04 PROCEDURE — 20610 DRAIN/INJ JOINT/BURSA W/O US: CPT

## 2024-09-04 RX ORDER — BETAMETHASONE SODIUM PHOSPHATE AND BETAMETHASONE ACETATE 3; 3 MG/ML; MG/ML
9 INJECTION, SUSPENSION INTRA-ARTICULAR; INTRALESIONAL; INTRAMUSCULAR; SOFT TISSUE
Status: COMPLETED | OUTPATIENT
Start: 2024-09-04 | End: 2024-09-04

## 2024-09-04 RX ORDER — BUPIVACAINE HYDROCHLORIDE 2.5 MG/ML
2 INJECTION, SOLUTION INFILTRATION; PERINEURAL
Status: COMPLETED | OUTPATIENT
Start: 2024-09-04 | End: 2024-09-04

## 2024-09-04 RX ADMIN — BUPIVACAINE HYDROCHLORIDE 2 ML: 2.5 INJECTION, SOLUTION INFILTRATION; PERINEURAL at 13:30

## 2024-09-04 RX ADMIN — BETAMETHASONE SODIUM PHOSPHATE AND BETAMETHASONE ACETATE 9 MG: 3; 3 INJECTION, SUSPENSION INTRA-ARTICULAR; INTRALESIONAL; INTRAMUSCULAR; SOFT TISSUE at 13:30

## 2024-09-04 NOTE — PROGRESS NOTES
Assessment:  1. Primary osteoarthritis of left knee  XR knee 3 vw left non injury    Injection Procedure Prior Authorization      2. Type 2 diabetes mellitus with other skin complications (HCC)          Patient Active Problem List   Diagnosis    Primary osteoarthritis of left knee    Arthritis of midfoot    Flat foot    Type 2 diabetes mellitus with other skin complications (HCC)       Plan:    63 y.o. adult  with osteoarthritis of the left knee     Discussed x ray of the left knee with patient   Discussed steroid injection of the left knee. Discussed side effects and risks. Patient agreeable. Injection administered.   Discussed repeat gel injections of the left knee as they have worked in the past. Patient agreeable. Prior auth submitted today.   Patient to follow up for course of gel injections     The assessment and plan were formulated by Dr. Diaz and I assisted in carrying it out.    Subjective:   Patient ID: Driss Hauser is a 63 y.o. adult .    HPI    Patient presents to the office for follow up of left knee pain. Notes persistent pain in the left knee increased with activity. Notes relief with previous CSI and gel.     The following portions of the patient's history were reviewed and updated as appropriate: allergies, current medications, past family history, past social history, past surgical history and problem list.    Social History     Socioeconomic History    Marital status: /Civil Union     Spouse name: Not on file    Number of children: Not on file    Years of education: Not on file    Highest education level: Not on file   Occupational History    Not on file   Tobacco Use    Smoking status: Never    Smokeless tobacco: Never   Substance and Sexual Activity    Alcohol use: Not Currently    Drug use: Never    Sexual activity: Not on file   Other Topics Concern    Not on file   Social History Narrative    Not on file     Social Determinants of Health     Financial Resource Strain: Not on file    Food Insecurity: Not on file   Transportation Needs: Not on file   Physical Activity: Not on file   Stress: Not on file   Social Connections: Not on file   Intimate Partner Violence: Not on file   Housing Stability: Not on file     Past Medical History:   Diagnosis Date    Diabetes mellitus (HCC)     Hypertension      History reviewed. No pertinent surgical history.  No Known Allergies  Current Outpatient Medications on File Prior to Visit   Medication Sig Dispense Refill    amLODIPine (NORVASC) 2.5 mg tablet Take by mouth      atorvastatin (LIPITOR) 10 mg tablet Take by mouth      clotrimazole-betamethasone (LOTRISONE) 1-0.05 % cream APPLY 1 APPLICATION EXTERNALLY TWICE A DAY FOR 90 DAYS      meloxicam (Mobic) 15 mg tablet Take 1 tablet (15 mg total) by mouth daily 30 tablet 0    metFORMIN (GLUCOPHAGE) 1000 MG tablet TAKE 1 TABLET WITH A MEAL BY MOUTH TWICE A DAY      methylPREDNISolone 4 MG tablet therapy pack Use as directed on package (Patient not taking: Reported on 11/29/2023) 1 each 0    metoprolol succinate (TOPROL-XL) 100 mg 24 hr tablet Take 100 mg by mouth daily      NIFEdipine (ADALAT CC) 90 mg 24 hr tablet TAKE 1 TABLET BY MOUTH EVERY DAY ON EMPTY STOMACH FOR 90 DAYS      ramipril (ALTACE) 1.25 mg capsule Take by mouth in the morning      triamcinolone (KENALOG) 0.1 % ointment Apply twice a day for one month 454 g 0    valsartan-hydrochlorothiazide (DIOVAN-HCT) 320-25 MG per tablet Take 1 tablet by mouth daily       No current facility-administered medications on file prior to visit.       Review of Systems   Constitutional:  Negative for chills and fever.   HENT:  Negative for ear pain and sore throat.    Eyes:  Negative for pain and visual disturbance.   Respiratory:  Negative for cough and shortness of breath.    Cardiovascular:  Negative for chest pain and palpitations.   Gastrointestinal:  Negative for abdominal pain and vomiting.   Genitourinary:  Negative for dysuria and hematuria.    Musculoskeletal:  Positive for arthralgias. Negative for back pain.   Skin:  Negative for color change and rash.   Neurological:  Negative for seizures and syncope.   All other systems reviewed and are negative.    See HPi    Objective:    Vitals:    09/04/24 1409   BP: 123/79   Pulse: 75       Physical Exam  Vitals and nursing note reviewed.   Constitutional:       General: He is not in acute distress.     Appearance: He is well-developed.   HENT:      Head: Normocephalic and atraumatic.   Eyes:      Conjunctiva/sclera: Conjunctivae normal.   Cardiovascular:      Rate and Rhythm: Normal rate and regular rhythm.      Heart sounds: No murmur heard.  Pulmonary:      Effort: Pulmonary effort is normal. No respiratory distress.      Breath sounds: Normal breath sounds.   Abdominal:      Palpations: Abdomen is soft.      Tenderness: There is no abdominal tenderness.   Musculoskeletal:         General: No swelling.      Cervical back: Neck supple.      Left knee: No effusion.   Skin:     General: Skin is warm and dry.      Capillary Refill: Capillary refill takes less than 2 seconds.   Neurological:      Mental Status: He is alert.   Psychiatric:         Mood and Affect: Mood normal.         Left Knee Exam     Tenderness   The patient is experiencing tenderness in the medial joint line, lateral joint line and patella.    Range of Motion   Extension:  normal   Flexion:  normal     Other   Erythema: absent  Scars: absent  Effusion: no effusion present    Comments:  +Patellar grind             I have personally reviewed pertinent films in PACS and my interpretation is xray left knee reveals moderate osteoarthritis of the medial tibiofemoral compartment consistent with Kellgren Grade 3-4 .    Large joint arthrocentesis: L knee  Universal Protocol:  Consent: Verbal consent obtained.  Risks and benefits: risks, benefits and alternatives were discussed  Consent given by: patient  Patient understanding: patient states  "understanding of the procedure being performed  Patient identity confirmed: verbally with patient  Supporting Documentation  Indications: pain   Procedure Details  Location: knee - L knee  Needle size: 22 G  Ultrasound guidance: no  Approach: anterolateral  Medications administered: 2 mL bupivacaine 0.25 %; 9 mg betamethasone acetate-betamethasone sodium phosphate 6 (3-3) mg/mL    Patient tolerance: patient tolerated the procedure well with no immediate complications  Dressing:  Sterile dressing applied              Portions of the record may have been created with voice recognition software.  Occasional wrong word or \"sound a like\" substitutions may have occurred due to the inherent limitations of voice recognition software.  Read the chart carefully and recognize, using context, where substitutions have occurred.   "

## 2024-10-02 ENCOUNTER — PROCEDURE VISIT (OUTPATIENT)
Dept: OBGYN CLINIC | Facility: CLINIC | Age: 64
End: 2024-10-02
Payer: COMMERCIAL

## 2024-10-02 VITALS — HEIGHT: 70 IN | BODY MASS INDEX: 35.65 KG/M2 | WEIGHT: 249 LBS

## 2024-10-02 DIAGNOSIS — M17.12 PRIMARY OSTEOARTHRITIS OF LEFT KNEE: Primary | ICD-10-CM

## 2024-10-02 PROCEDURE — 20610 DRAIN/INJ JOINT/BURSA W/O US: CPT

## 2024-10-02 RX ORDER — HYALURONATE SODIUM 10 MG/ML
20 SYRINGE (ML) INTRAARTICULAR
Status: COMPLETED | OUTPATIENT
Start: 2024-10-02 | End: 2024-10-02

## 2024-10-02 RX ADMIN — Medication 20 MG: at 11:00

## 2024-10-02 NOTE — PROGRESS NOTES
1. Primary osteoarthritis of left knee          Patient is here for his 1st injection of Euflexxa into the left knee.     Patient rates their pain as 5/10 today    Physical exam of the knee shows no effusion no ecchymosis.      Large joint arthrocentesis: L knee  Universal Protocol:  Consent: Verbal consent obtained.  Risks and benefits: risks, benefits and alternatives were discussed  Consent given by: patient  Patient understanding: patient states understanding of the procedure being performed  Patient identity confirmed: verbally with patient  Supporting Documentation  Indications: pain   Procedure Details  Location: knee - L knee  Needle size: 22 G  Approach: anterolateral  Medications administered: 20 mg Sodium Hyaluronate (Viscosup) 20 MG/2ML  Specialty Pharmacy Supplied: received medications from pharmacy  Patient tolerance: patient tolerated the procedure well with no immediate complications  Dressing:  Sterile dressing applied            Patient tolerated procedure follow up 1 week for 2nd injection of Euflexxa into the left knee

## 2024-10-09 ENCOUNTER — PROCEDURE VISIT (OUTPATIENT)
Dept: OBGYN CLINIC | Facility: CLINIC | Age: 64
End: 2024-10-09
Payer: COMMERCIAL

## 2024-10-09 VITALS
SYSTOLIC BLOOD PRESSURE: 142 MMHG | WEIGHT: 249 LBS | DIASTOLIC BLOOD PRESSURE: 91 MMHG | HEART RATE: 86 BPM | HEIGHT: 70 IN | BODY MASS INDEX: 35.65 KG/M2

## 2024-10-09 DIAGNOSIS — M17.12 PRIMARY OSTEOARTHRITIS OF LEFT KNEE: Primary | ICD-10-CM

## 2024-10-09 PROCEDURE — 20610 DRAIN/INJ JOINT/BURSA W/O US: CPT | Performed by: PHYSICIAN ASSISTANT

## 2024-10-09 RX ORDER — SEMAGLUTIDE 0.68 MG/ML
0.25 INJECTION, SOLUTION SUBCUTANEOUS
COMMUNITY
Start: 2024-08-29

## 2024-10-09 RX ORDER — HYALURONATE SODIUM 10 MG/ML
20 SYRINGE (ML) INTRAARTICULAR
Status: COMPLETED | OUTPATIENT
Start: 2024-10-09 | End: 2024-10-09

## 2024-10-09 RX ADMIN — Medication 20 MG: at 10:00

## 2024-10-09 NOTE — PROGRESS NOTES
1. Primary osteoarthritis of left knee  Large joint arthrocentesis: L knee    Sodium Hyaluronate (Viscosup) 20 mg          Patient is here for his 2nd injection of Euflexxa into the left knee.     Patient rates their pain as 5/10 today    Physical exam of the knee shows no effusion no ecchymosis.      Large joint arthrocentesis: L knee  Universal Protocol:  Consent: Verbal consent obtained.  Risks and benefits: risks, benefits and alternatives were discussed  Consent given by: patient  Patient understanding: patient states understanding of the procedure being performed  Patient identity confirmed: verbally with patient  Supporting Documentation  Indications: pain   Procedure Details  Location: knee - L knee  Needle size: 22 G  Approach: anterolateral  Medications administered: 20 mg Sodium Hyaluronate (Viscosup) 20 MG/2ML  Specialty Pharmacy Supplied: received medications from pharmacy  Patient tolerance: patient tolerated the procedure well with no immediate complications  Dressing:  Sterile dressing applied            Patient tolerated procedure follow up 1 week for 3rd  injection of Euflexxa into the left knee

## 2024-10-16 ENCOUNTER — PROCEDURE VISIT (OUTPATIENT)
Dept: OBGYN CLINIC | Facility: CLINIC | Age: 64
End: 2024-10-16
Payer: COMMERCIAL

## 2024-10-16 VITALS — HEIGHT: 70 IN | WEIGHT: 249 LBS | BODY MASS INDEX: 35.65 KG/M2

## 2024-10-16 DIAGNOSIS — M17.12 PRIMARY OSTEOARTHRITIS OF LEFT KNEE: Primary | ICD-10-CM

## 2024-10-16 PROCEDURE — 20610 DRAIN/INJ JOINT/BURSA W/O US: CPT | Performed by: ORTHOPAEDIC SURGERY

## 2024-10-16 RX ORDER — HYALURONATE SODIUM 10 MG/ML
20 SYRINGE (ML) INTRAARTICULAR
Status: COMPLETED | OUTPATIENT
Start: 2024-10-16 | End: 2024-10-16

## 2024-10-16 RX ADMIN — Medication 20 MG: at 10:00

## 2024-10-16 NOTE — PROGRESS NOTES
"No diagnosis found.  Patient is here for his 3rd  injection of Euflexxa into the left knee. Patient reports pain 5 out of 10.      All organ systems normal  Physical exam of the knee shows no effusion no ecchymosis.      Large joint arthrocentesis: L knee  Universal Protocol:  procedure performed by consultantConsent: Verbal consent obtained.  Risks and benefits: risks, benefits and alternatives were discussed  Consent given by: patient  Time out: Immediately prior to procedure a \"time out\" was called to verify the correct patient, procedure, equipment, support staff and site/side marked as required.  Timeout called at: 10/16/2024 9:40 AM.  Patient understanding: patient states understanding of the procedure being performed  Site marked: the operative site was marked  Procedure Details  Location: knee - L knee  Needle size: 22 G  Approach: lateral  Medications administered: 20 mg Sodium Hyaluronate (Viscosup) 20 MG/2ML    Patient tolerance: patient tolerated the procedure well with no immediate complications  Dressing:  Sterile dressing applied          Patient tolerated procedure follow up prn  "

## 2025-01-30 ENCOUNTER — OFFICE VISIT (OUTPATIENT)
Dept: OBGYN CLINIC | Facility: CLINIC | Age: 65
End: 2025-01-30
Payer: COMMERCIAL

## 2025-01-30 ENCOUNTER — APPOINTMENT (OUTPATIENT)
Dept: RADIOLOGY | Facility: AMBULARY SURGERY CENTER | Age: 65
End: 2025-01-30
Attending: ORTHOPAEDIC SURGERY
Payer: COMMERCIAL

## 2025-01-30 VITALS — WEIGHT: 249 LBS | HEIGHT: 70 IN | BODY MASS INDEX: 35.65 KG/M2

## 2025-01-30 DIAGNOSIS — M79.671 PAIN IN RIGHT FOOT: Primary | ICD-10-CM

## 2025-01-30 DIAGNOSIS — M19.071 ARTHRITIS OF RIGHT MIDFOOT: ICD-10-CM

## 2025-01-30 DIAGNOSIS — M79.671 PAIN IN RIGHT FOOT: ICD-10-CM

## 2025-01-30 DIAGNOSIS — M21.611 BUNION OF GREAT TOE OF RIGHT FOOT: ICD-10-CM

## 2025-01-30 PROCEDURE — 99213 OFFICE O/P EST LOW 20 MIN: CPT | Performed by: ORTHOPAEDIC SURGERY

## 2025-01-30 PROCEDURE — 73630 X-RAY EXAM OF FOOT: CPT

## 2025-01-30 NOTE — PROGRESS NOTES
Assessment:  1. Pain in right foot  XR foot 3+ vw right      2. Arthritis of right midfoot  XR foot 3+ vw right      3. Bunion of great toe of right foot  Ambulatory Referral to Podiatry        Patient Active Problem List   Diagnosis    Primary osteoarthritis of left knee    Arthritis of midfoot    Flat foot    Type 2 diabetes mellitus with other skin complications (HCC)       Plan:    64 y.o. male  with right great toe bunion    Diagnosis and imaging discussed with the patient.  Patient would like to discuss surgical options.  Patient will be referred to podiatry for further treatment discussions.          The patient was seen and examined by Dr. Diaz and myself. The assessment and plan were formulated by Dr. Diaz and I assisted in carrying it out.      Subjective:   Patient ID: Driss Hauser is a 64 y.o. male .    HPI    Patient presents to the office for follow up of right great toe pain.  Patient states that he had right great toe pain one week ago that caused pain with walking. He states that the pain resolved on its own. Patient states that he has on and off pain in the right great toe MTP. Patient has history of gout in the toe in the past.     The following portions of the patient's history were reviewed and updated as appropriate: allergies, current medications, past family history, past social history, past surgical history and problem list.    Social History     Socioeconomic History    Marital status: /Civil Union     Spouse name: Not on file    Number of children: Not on file    Years of education: Not on file    Highest education level: Not on file   Occupational History    Not on file   Tobacco Use    Smoking status: Never    Smokeless tobacco: Never   Substance and Sexual Activity    Alcohol use: Not Currently    Drug use: Never    Sexual activity: Not on file   Other Topics Concern    Not on file   Social History Narrative    Not on file     Social Drivers of Health     Financial Resource  Strain: Not on file   Food Insecurity: Not on file   Transportation Needs: Not on file   Physical Activity: Not on file   Stress: Not on file   Social Connections: Not on file   Intimate Partner Violence: Not on file   Housing Stability: Not on file     Past Medical History:   Diagnosis Date    Diabetes mellitus (HCC)     Hypertension      History reviewed. No pertinent surgical history.  No Known Allergies  Current Outpatient Medications on File Prior to Visit   Medication Sig Dispense Refill    amLODIPine (NORVASC) 2.5 mg tablet Take by mouth      atorvastatin (LIPITOR) 10 mg tablet Take by mouth      clotrimazole-betamethasone (LOTRISONE) 1-0.05 % cream APPLY 1 APPLICATION EXTERNALLY TWICE A DAY FOR 90 DAYS      meloxicam (Mobic) 15 mg tablet Take 1 tablet (15 mg total) by mouth daily 30 tablet 0    metFORMIN (GLUCOPHAGE) 1000 MG tablet TAKE 1 TABLET WITH A MEAL BY MOUTH TWICE A DAY      methylPREDNISolone 4 MG tablet therapy pack Use as directed on package (Patient not taking: Reported on 11/29/2023) 1 each 0    metoprolol succinate (TOPROL-XL) 100 mg 24 hr tablet Take 100 mg by mouth daily      NIFEdipine (ADALAT CC) 90 mg 24 hr tablet TAKE 1 TABLET BY MOUTH EVERY DAY ON EMPTY STOMACH FOR 90 DAYS      Ozempic, 0.25 or 0.5 MG/DOSE, 2 MG/3ML injection pen 0.25 mg (Patient not taking: Reported on 10/9/2024)      ramipril (ALTACE) 1.25 mg capsule Take by mouth in the morning      triamcinolone (KENALOG) 0.1 % ointment Apply twice a day for one month 454 g 0    valsartan-hydrochlorothiazide (DIOVAN-HCT) 320-25 MG per tablet Take 1 tablet by mouth daily       No current facility-administered medications on file prior to visit.       Review of Systems   Constitutional:  Negative for chills and fever.   HENT:  Negative for ear pain and sore throat.    Eyes:  Negative for pain and visual disturbance.   Respiratory:  Negative for cough and shortness of breath.    Cardiovascular:  Negative for chest pain and palpitations.  "  Gastrointestinal:  Negative for abdominal pain and vomiting.   Genitourinary:  Negative for dysuria and hematuria.   Musculoskeletal:  Positive for arthralgias. Negative for back pain.   Skin:  Negative for color change and rash.   Neurological:  Negative for seizures and syncope.   All other systems reviewed and are negative.    See HPi    Objective:    There were no vitals filed for this visit.    Physical Exam  Vitals and nursing note reviewed.   Constitutional:       General: He is not in acute distress.     Appearance: He is well-developed.   HENT:      Head: Normocephalic and atraumatic.   Eyes:      Conjunctiva/sclera: Conjunctivae normal.   Cardiovascular:      Rate and Rhythm: Normal rate and regular rhythm.      Heart sounds: No murmur heard.  Pulmonary:      Effort: Pulmonary effort is normal. No respiratory distress.      Breath sounds: Normal breath sounds.   Abdominal:      Palpations: Abdomen is soft.      Tenderness: There is no abdominal tenderness.   Musculoskeletal:         General: No swelling.      Cervical back: Neck supple.   Skin:     General: Skin is warm and dry.      Capillary Refill: Capillary refill takes less than 2 seconds.   Neurological:      Mental Status: He is alert.   Psychiatric:         Mood and Affect: Mood normal.         Right Ankle Exam     Tenderness   Right ankle tenderness location: right great toe MTP tenderness.  Swelling: mild    Other   Sensation: normal  Pulse: present     Comments:  Evidence of right great toe MTP bunion.            I have personally reviewed pertinent films in PACS and my interpretation is evidence of midfoot arthritis, right great toe MTP degenerative changes, bunion.      Portions of the record may have been created with voice recognition software.  Occasional wrong word or \"sound a like\" substitutions may have occurred due to the inherent limitations of voice recognition software.  Read the chart carefully and recognize, using context, where " substitutions have occurred.

## 2025-03-13 ENCOUNTER — OFFICE VISIT (OUTPATIENT)
Dept: PODIATRY | Facility: CLINIC | Age: 65
End: 2025-03-13
Payer: COMMERCIAL

## 2025-03-13 VITALS — BODY MASS INDEX: 36.08 KG/M2 | HEART RATE: 89 BPM | HEIGHT: 70 IN | OXYGEN SATURATION: 97 % | WEIGHT: 252 LBS

## 2025-03-13 DIAGNOSIS — M21.611 BUNION OF GREAT TOE OF RIGHT FOOT: Primary | ICD-10-CM

## 2025-03-13 DIAGNOSIS — M17.12 PRIMARY OSTEOARTHRITIS OF LEFT KNEE: ICD-10-CM

## 2025-03-13 DIAGNOSIS — M19.079 ARTHRITIS OF METATARSOPHALANGEAL (MTP) JOINT OF GREAT TOE: ICD-10-CM

## 2025-03-13 PROCEDURE — 99203 OFFICE O/P NEW LOW 30 MIN: CPT | Performed by: PODIATRIST

## 2025-03-13 RX ORDER — ORAL SEMAGLUTIDE 7 MG/1
7 TABLET ORAL
COMMUNITY
Start: 2025-03-08

## 2025-03-13 RX ORDER — MELOXICAM 15 MG/1
15 TABLET ORAL DAILY
Qty: 30 TABLET | Refills: 0 | Status: SHIPPED | OUTPATIENT
Start: 2025-03-13

## 2025-03-13 NOTE — ASSESSMENT & PLAN NOTE
Orders:    meloxicam (Mobic) 15 mg tablet; Take 1 tablet (15 mg total) by mouth daily  The patient's clinical examination today significant for a moderate bunion deformity of the right foot with tenderness isolated to the dorsal medial bony prominence.  There is no erythema nor edema no calor ecchymosis.  There are no open lesions.  Pedal pulses palpable.  The interdigital spaces are clear without maceration.    X-rays of the patient's right foot taken in January 2025 were personally reviewed and interpreted.  Findings were significant for arthritic changes of the right first metatarsophalangeal joint with a cystic cavity noted to the dorsal medial head and neck.  IM angle measures at about 13.6 degrees.  Arthritic changes are also noted at the first met cuneiform joint.    X-ray images reviewed the patient.  The patient is considering surgical intervention.  Due to the cystic changes in the first metatarsal head and neck, a base procedure would be ideal, as it would also address the arthritic changes to the first met cuneiform joint.  We discussed the potential benefits of a Lapa plasty procedure.  We discussed the perioperative course.  He would be nonweightbearing for the first 2 weeks and then guarded weightbearing in the cam boot for another 4 weeks.  He would likely be out of work for at least 8 weeks.    In the interim, we did discuss accommodative shoe gear with wider toe boxes and softer uppers.  He could benefit from OTC arch supports and some suggestions were added to his AVS.  Anti-inflammatory therapy could also be helpful for acute episodes of pain.  I did renew his prior prescription for meloxicam 50 mg daily to be taken as needed.    He will discuss these treatment options with his family.  He can contact our office if he is ready to proceed with surgery or if he has any further questions.

## 2025-03-13 NOTE — PATIENT INSTRUCTIONS
Recommend quality over the counter arch supports:    - Powerstep Geneva series insoles  - Spenco Orthotic Arch insoles

## 2025-03-13 NOTE — PROGRESS NOTES
:  Assessment & Plan  Bunion of great toe of right foot    Orders:    Ambulatory Referral to Podiatry    Arthritis of metatarsophalangeal (MTP) joint of great toe         Primary osteoarthritis of left knee    Orders:    meloxicam (Mobic) 15 mg tablet; Take 1 tablet (15 mg total) by mouth daily  The patient's clinical examination today significant for a moderate bunion deformity of the right foot with tenderness isolated to the dorsal medial bony prominence.  There is no erythema nor edema no calor ecchymosis.  There are no open lesions.  Pedal pulses palpable.  The interdigital spaces are clear without maceration.    X-rays of the patient's right foot taken in January 2025 were personally reviewed and interpreted.  Findings were significant for arthritic changes of the right first metatarsophalangeal joint with a cystic cavity noted to the dorsal medial head and neck.  IM angle measures at about 13.6 degrees.  Arthritic changes are also noted at the first met cuneiform joint.    X-ray images reviewed the patient.  The patient is considering surgical intervention.  Due to the cystic changes in the first metatarsal head and neck, a base procedure would be ideal, as it would also address the arthritic changes to the first met cuneiform joint.  We discussed the potential benefits of a Lapa plasty procedure.  We discussed the perioperative course.  He would be nonweightbearing for the first 2 weeks and then guarded weightbearing in the cam boot for another 4 weeks.  He would likely be out of work for at least 8 weeks.    In the interim, we did discuss accommodative shoe gear with wider toe boxes and softer uppers.  He could benefit from OTC arch supports and some suggestions were added to his AVS.  Anti-inflammatory therapy could also be helpful for acute episodes of pain.  I did renew his prior prescription for meloxicam 50 mg daily to be taken as needed.    He will discuss these treatment options with his family.  He  can contact our office if he is ready to proceed with surgery or if he has any further questions.      History of Present Illness     Driss Hauser is a 64 y.o. male   The patient presents today for his initial consultation with [facility]'s podiatry with a chief complaint of right foot bunion pain that has been present for several years.  The patient does note that is becoming more painful.  He has been trying to wear softer and stretchy shoes but still notes pain on a nearly daily basis.  He does take OTC NSAID therapy on an as-needed basis.      PAST MEDICAL HISTORY:  Past Medical History:   Diagnosis Date    Diabetes mellitus (HCC)     Hypertension        PAST SURGICAL HISTORY:  History reviewed. No pertinent surgical history.     ALLERGIES:  Patient has no known allergies.    MEDICATIONS:  Current Outpatient Medications   Medication Sig Dispense Refill    amLODIPine (NORVASC) 2.5 mg tablet Take by mouth      atorvastatin (LIPITOR) 10 mg tablet Take by mouth      clotrimazole-betamethasone (LOTRISONE) 1-0.05 % cream APPLY 1 APPLICATION EXTERNALLY TWICE A DAY FOR 90 DAYS      meloxicam (Mobic) 15 mg tablet Take 1 tablet (15 mg total) by mouth daily 30 tablet 0    metFORMIN (GLUCOPHAGE) 1000 MG tablet TAKE 1 TABLET WITH A MEAL BY MOUTH TWICE A DAY      metoprolol succinate (TOPROL-XL) 100 mg 24 hr tablet Take 100 mg by mouth daily      NIFEdipine (ADALAT CC) 90 mg 24 hr tablet TAKE 1 TABLET BY MOUTH EVERY DAY ON EMPTY STOMACH FOR 90 DAYS      ramipril (ALTACE) 1.25 mg capsule Take by mouth in the morning      Rybelsus 7 MG tablet Take 7 mg by mouth daily before breakfast      triamcinolone (KENALOG) 0.1 % ointment Apply twice a day for one month 454 g 0    valsartan-hydrochlorothiazide (DIOVAN-HCT) 320-25 MG per tablet Take 1 tablet by mouth daily      methylPREDNISolone 4 MG tablet therapy pack Use as directed on package (Patient not taking: Reported on 11/29/2023) 1 each 0     No current facility-administered  "medications for this visit.       SOCIAL HISTORY:  Social History     Socioeconomic History    Marital status: /Civil Union     Spouse name: None    Number of children: None    Years of education: None    Highest education level: None   Occupational History    None   Tobacco Use    Smoking status: Never    Smokeless tobacco: Never   Substance and Sexual Activity    Alcohol use: Not Currently    Drug use: Never    Sexual activity: None   Other Topics Concern    None   Social History Narrative    None     Social Drivers of Health     Financial Resource Strain: Not on file   Food Insecurity: Not on file   Transportation Needs: Not on file   Physical Activity: Not on file   Stress: Not on file   Social Connections: Not on file   Intimate Partner Violence: Not on file   Housing Stability: Not on file      Review of Systems   Constitutional: Negative.    HENT: Negative.     Eyes: Negative.    Respiratory: Negative.     Cardiovascular: Negative.    Endocrine: Negative.    Musculoskeletal: Negative.    Neurological: Negative.    Hematological: Negative.    Psychiatric/Behavioral: Negative.       Objective   Pulse 89   Ht 5' 10\" (1.778 m)   Wt 114 kg (252 lb)   SpO2 97%   BMI 36.16 kg/m²      Physical Exam  Constitutional:       Appearance: Normal appearance.   HENT:      Head: Normocephalic and atraumatic.   Cardiovascular:      Pulses:           Dorsalis pedis pulses are 2+ on the right side.        Posterior tibial pulses are 2+ on the right side.   Pulmonary:      Effort: Pulmonary effort is normal.   Musculoskeletal:      Right foot: Decreased range of motion. Bunion present.   Feet:      Right foot:      Skin integrity: Skin integrity normal.      Comments: The patient's clinical examination today significant for a moderate bunion deformity of the right foot with tenderness isolated to the dorsal medial bony prominence.  There is no erythema nor edema no calor ecchymosis.  There are no open lesions.  Pedal " pulses palpable.  The interdigital spaces are clear without maceration.    Skin:     General: Skin is warm and dry.      Capillary Refill: Capillary refill takes less than 2 seconds.   Neurological:      General: No focal deficit present.      Mental Status: He is alert and oriented to person, place, and time.   Psychiatric:         Mood and Affect: Mood normal.

## 2025-04-09 DIAGNOSIS — M17.12 PRIMARY OSTEOARTHRITIS OF LEFT KNEE: ICD-10-CM

## 2025-04-10 ENCOUNTER — HOSPITAL ENCOUNTER (EMERGENCY)
Facility: HOSPITAL | Age: 65
Discharge: HOME | End: 2025-04-10
Attending: STUDENT IN AN ORGANIZED HEALTH CARE EDUCATION/TRAINING PROGRAM
Payer: COMMERCIAL

## 2025-04-10 VITALS
SYSTOLIC BLOOD PRESSURE: 167 MMHG | OXYGEN SATURATION: 99 % | HEART RATE: 96 BPM | DIASTOLIC BLOOD PRESSURE: 87 MMHG | RESPIRATION RATE: 18 BRPM

## 2025-04-10 DIAGNOSIS — M54.16 ACUTE RIGHT LUMBAR RADICULOPATHY: Primary | ICD-10-CM

## 2025-04-10 PROCEDURE — 99284 EMERGENCY DEPT VISIT MOD MDM: CPT | Performed by: STUDENT IN AN ORGANIZED HEALTH CARE EDUCATION/TRAINING PROGRAM

## 2025-04-10 PROCEDURE — 99283 EMERGENCY DEPT VISIT LOW MDM: CPT

## 2025-04-10 PROCEDURE — 96372 THER/PROPH/DIAG INJ SC/IM: CPT

## 2025-04-10 RX ORDER — KETOROLAC TROMETHAMINE 30 MG/ML
15 INJECTION, SOLUTION INTRAMUSCULAR; INTRAVENOUS ONCE
Status: COMPLETED | OUTPATIENT
Start: 2025-04-10 | End: 2025-04-10

## 2025-04-10 RX ORDER — METHOCARBAMOL 500 MG/1
500 TABLET, FILM COATED ORAL ONCE
Status: COMPLETED | OUTPATIENT
Start: 2025-04-10 | End: 2025-04-10

## 2025-04-10 RX ORDER — LIDOCAINE 50 MG/G
1 PATCH TOPICAL ONCE
Status: DISCONTINUED | OUTPATIENT
Start: 2025-04-10 | End: 2025-04-10 | Stop reason: HOSPADM

## 2025-04-10 RX ORDER — ACETAMINOPHEN 325 MG/1
975 TABLET ORAL ONCE
Status: COMPLETED | OUTPATIENT
Start: 2025-04-10 | End: 2025-04-10

## 2025-04-10 RX ADMIN — KETOROLAC TROMETHAMINE 15 MG: 30 INJECTION, SOLUTION INTRAMUSCULAR; INTRAVENOUS at 10:04

## 2025-04-10 RX ADMIN — LIDOCAINE 1 PATCH: 700 PATCH TOPICAL at 10:04

## 2025-04-10 RX ADMIN — ACETAMINOPHEN 975 MG: 325 TABLET, FILM COATED ORAL at 10:00

## 2025-04-10 RX ADMIN — METHOCARBAMOL 500 MG: 500 TABLET ORAL at 10:00

## 2025-04-10 NOTE — ED NOTES
Pt called reporting his medications were not sent to [facility]. Patient informed that the discharge paperwork has over the counter medications on the form and nothing that needed to be sent to the pharmacy. Pt verbalized understanding and denied further questions.     Meseret Preciado RN  04/10/25 1857

## 2025-04-10 NOTE — ED PROVIDER NOTES
Time reflects when diagnosis was documented in both MDM as applicable and the Disposition within this note       Time User Action Codes Description Comment    4/10/2025  9:36 AM [place] [M54.16] Acute right lumbar radiculopathy           ED Disposition       ED Disposition   Discharge    Condition   Stable    Date/Time   Thu Apr 10, 2025 10:36 AM    Comment   Driss Dodsoner discharge to home/self care.                   Assessment & Plan       Medical Decision Making  64-year-old male with history of type 2 diabetes and osteoarthritis who presents with 1 day of right sided back pain with radiation to the right lower extremity without numbness/tingling/weakness.    No red flag symptoms, specifically no fevers/chills, saddle anesthesia, urinary/bowel incontinence, other numbness/tingling/weakness, hx of trauma, malignancy, or IVDU.      Differential diagnosis includes but is not limited to: musculoskeletal strain, sciatica, chronic back pain    Workup and treatment as below:    Imaging: N/A  EKG: N/A  Labs: N/A  Meds: analgesia  Consults: N/A  Reassessment: N/A    Dispo: Patient was discharged to home with strict return precautions and referral to comprehensive spine program. Patient acknowledged understanding of plan and diagnostic results, and all their questions were answered to their satisfaction.       Risk  OTC drugs.  Prescription drug management.             Medications   acetaminophen (TYLENOL) tablet 975 mg (975 mg Oral Given 4/10/25 1000)   ketorolac (TORADOL) injection 15 mg (15 mg Intramuscular Given 4/10/25 1004)   methocarbamol (ROBAXIN) tablet 500 mg (500 mg Oral Given 4/10/25 1000)       ED Risk Strat Scores                    No data recorded        SBIRT 20yo+      Flowsheet Row Most Recent Value   Initial Alcohol Screen: US AUDIT-C     1. How often do you have a drink containing alcohol? 0 Filed at: 04/10/2025 0926   2. How many drinks containing alcohol do you have on a typical day you are  drinking?  0 Filed at: 04/10/2025 0926   3a. Male UNDER 65: How often do you have five or more drinks on one occasion? 0 Filed at: 04/10/2025 0926   Audit-C Score 0 Filed at: 04/10/2025 0926   KARL: How many times in the past year have you...    Used an illegal drug or used a prescription medication for non-medical reasons? Never Filed at: 04/10/2025 0926                            History of Present Illness       Chief Complaint   Patient presents with    Back Pain     Pt c/o R lower back pain that started yesterday at work. +R leg tingling that started around the same time. Denies any other symptoms        Past Medical History:   Diagnosis Date    Diabetes mellitus (HCC)     Hypertension       History reviewed. No pertinent surgical history.   History reviewed. No pertinent family history.   Social History     Tobacco Use    Smoking status: Never    Smokeless tobacco: Never   Substance Use Topics    Alcohol use: Not Currently    Drug use: Never      E-Cigarette/Vaping      E-Cigarette/Vaping Substances      I have reviewed and agree with the history as documented.     64-year-old male with history of type 2 diabetes and osteoarthritis who presents with 1 day of right sided back pain with radiation to the right lower extremity without numbness/tingling/weakness.    No red flag symptoms, specifically no fevers/chills, saddle anesthesia, urinary/bowel incontinence, other numbness/tingling/weakness, hx of trauma, malignancy, or IVDU.          Review of Systems   Constitutional:  Negative for chills and fever.   HENT:  Negative for ear pain and sore throat.    Eyes:  Negative for pain and visual disturbance.   Respiratory:  Negative for cough and shortness of breath.    Cardiovascular:  Negative for chest pain and palpitations.   Gastrointestinal:  Negative for abdominal pain, blood in stool, constipation, diarrhea, nausea and vomiting.   Genitourinary:  Negative for dysuria and hematuria.   Musculoskeletal:  Positive  for back pain. Negative for arthralgias and gait problem.   Skin:  Negative for color change and rash.   Neurological:  Negative for dizziness, seizures, syncope, facial asymmetry, speech difficulty, weakness, light-headedness, numbness and headaches.   All other systems reviewed and are negative.          Objective       ED Triage Vitals   Temp Pulse Blood Pressure Respirations SpO2 Patient Position - Orthostatic VS   -- 04/10/25 0926 04/10/25 0926 04/10/25 0926 04/10/25 0926 04/10/25 0926    101 (!) 172/96 18 98 % Sitting      Temp src Heart Rate Source BP Location FiO2 (%) Pain Score    -- 04/10/25 0926 04/10/25 0926 -- 04/10/25 1000     Monitor Right arm  8      Vitals      Date and Time Temp Pulse SpO2 Resp BP Pain Score FACES Pain Rating User   04/10/25 1004 -- 96 99 % 18 167/87 8 -- ML   04/10/25 1000 -- -- -- -- -- 8 -- ML   04/10/25 0926 -- 101 98 % 18 172/96 -- -- LS            Physical Exam  Vitals and nursing note reviewed. Exam conducted with a chaperone present.   Constitutional:       General: He is not in acute distress.     Appearance: He is not ill-appearing or toxic-appearing.   HENT:      Head: Normocephalic.   Cardiovascular:      Rate and Rhythm: Normal rate and regular rhythm.      Heart sounds: Normal heart sounds.   Pulmonary:      Effort: Pulmonary effort is normal.      Breath sounds: Normal breath sounds.   Abdominal:      Palpations: Abdomen is soft.      Tenderness: There is no abdominal tenderness. There is no guarding or rebound.   Musculoskeletal:      Right lower leg: No edema.      Left lower leg: No edema.      Comments: Right lumbar paraspinal tenderness to palpation.  No midline spinal TTP/stepoffs/deformities. 5/5 strength with SILT throughout bilateral lower extremities.   Skin:     General: Skin is warm.      Capillary Refill: Capillary refill takes less than 2 seconds.   Neurological:      Mental Status: He is alert and oriented to person, place, and time.   Psychiatric:          Mood and Affect: Mood normal.         Results Reviewed       None            No orders to display       Procedures    ED Medication and Procedure Management   Prior to Admission Medications   Prescriptions Last Dose Informant Patient Reported? Taking?   NIFEdipine (ADALAT CC) 90 mg 24 hr tablet  Self Yes No   Sig: TAKE 1 TABLET BY MOUTH EVERY DAY ON EMPTY STOMACH FOR 90 DAYS   Rybelsus 7 MG tablet   Yes No   Sig: Take 7 mg by mouth daily before breakfast   amLODIPine (NORVASC) 2.5 mg tablet  Self Yes No   Sig: Take by mouth   atorvastatin (LIPITOR) 10 mg tablet  Self Yes No   Sig: Take by mouth   clotrimazole-betamethasone (LOTRISONE) 1-0.05 % cream  Self Yes No   Sig: APPLY 1 APPLICATION EXTERNALLY TWICE A DAY FOR 90 DAYS   meloxicam (Mobic) 15 mg tablet   No No   Sig: Take 1 tablet (15 mg total) by mouth daily   metFORMIN (GLUCOPHAGE) 1000 MG tablet  Self Yes No   Sig: TAKE 1 TABLET WITH A MEAL BY MOUTH TWICE A DAY   methylPREDNISolone 4 MG tablet therapy pack  Self No No   Sig: Use as directed on package   Patient not taking: Reported on 11/29/2023   metoprolol succinate (TOPROL-XL) 100 mg 24 hr tablet  Self Yes No   Sig: Take 100 mg by mouth daily   ramipril (ALTACE) 1.25 mg capsule  Self Yes No   Sig: Take by mouth in the morning   triamcinolone (KENALOG) 0.1 % ointment  Self No No   Sig: Apply twice a day for one month   valsartan-hydrochlorothiazide (DIOVAN-HCT) 320-25 MG per tablet  Self Yes No   Sig: Take 1 tablet by mouth daily      Facility-Administered Medications: None     Discharge Medication List as of 4/10/2025 10:36 AM        CONTINUE these medications which have NOT CHANGED    Details   amLODIPine (NORVASC) 2.5 mg tablet Take by mouth, Starting Mon 2/20/2017, Historical Med      atorvastatin (LIPITOR) 10 mg tablet Take by mouth, Starting Mon 2/20/2017, Historical Med      clotrimazole-betamethasone (LOTRISONE) 1-0.05 % cream APPLY 1 APPLICATION EXTERNALLY TWICE A DAY FOR 90 DAYS, Historical  Med      meloxicam (Mobic) 15 mg tablet Take 1 tablet (15 mg total) by mouth daily, Starting Thu 3/13/2025, Normal      metFORMIN (GLUCOPHAGE) 1000 MG tablet TAKE 1 TABLET WITH A MEAL BY MOUTH TWICE A DAY, Historical Med      methylPREDNISolone 4 MG tablet therapy pack Use as directed on package, Normal      metoprolol succinate (TOPROL-XL) 100 mg 24 hr tablet Take 100 mg by mouth daily, Starting Thu 10/13/2022, Historical Med      NIFEdipine (ADALAT CC) 90 mg 24 hr tablet TAKE 1 TABLET BY MOUTH EVERY DAY ON EMPTY STOMACH FOR 90 DAYS, Historical Med      ramipril (ALTACE) 1.25 mg capsule Take by mouth in the morning, Starting Mon 2/20/2017, Historical Med      Rybelsus 7 MG tablet Take 7 mg by mouth daily before breakfast, Starting Sat 3/8/2025, Historical Med      triamcinolone (KENALOG) 0.1 % ointment Apply twice a day for one month, Normal      valsartan-hydrochlorothiazide (DIOVAN-HCT) 320-25 MG per tablet Take 1 tablet by mouth daily, Starting Thu 10/13/2022, Historical Med             ED SEPSIS DOCUMENTATION   Time reflects when diagnosis was documented in both MDM as applicable and the Disposition within this note       Time User Action Codes Description Comment    4/10/2025  9:36 AM Lakesha Flores Add [M54.16] Acute right lumbar radiculopathy                  Lakesha Flores MD  04/10/25 5807

## 2025-04-10 NOTE — DISCHARGE INSTRUCTIONS
You were seen in the Emergency Department for: Low back pain    Your workup today showed: No evidence of nerve or bony injury    Your next steps should include: A referral has been made to our comprehensive spine program.  You should receive a call from them in the next 3 days to schedule an appointment.  Please also call today to make an appointment with your primary care provider in one week.    Pain management: You may take two extra strength Tylenol every 6 hours as needed for pain as well as two 200 mg ibuprofen pills every 6 hours for the first 48 hours.  Alternate these medicines so that you take 2 Tylenol pills, then wait 3 hours before taking 2 ibuprofen pills, then wait 3 hours before your next dose of Tylenol and repeat.    Reasons to RETURN IMMEDIATELY to the Emergency Department: Loss of control of bowel/bladder, new numbness/tingling/weakness of your legs, temperature > 100.4 degrees, uncontrollable nausea/vomiting, or any other concerns.

## 2025-04-10 NOTE — Clinical Note
Driss Hauser was seen and treated in our emergency department on 4/10/2025.                Diagnosis:     Driss  .    He may return on this date:          If you have any questions or concerns, please don't hesitate to call.      Lakesha Flores MD    ______________________________           _______________          _______________  Hospital Representative                              Date                                Time

## 2025-04-11 ENCOUNTER — TELEPHONE (OUTPATIENT)
Dept: PHYSICAL THERAPY | Facility: OTHER | Age: 65
End: 2025-04-11

## 2025-04-11 ENCOUNTER — NURSE TRIAGE (OUTPATIENT)
Dept: PHYSICAL THERAPY | Facility: OTHER | Age: 65
End: 2025-04-11

## 2025-04-11 NOTE — TELEPHONE ENCOUNTER
Call placed to the patient regarding the referral entered for  [facility].     Message left for the patient. Contact information and hours of operation provided.  Requested the patient to CB when available to discuss CSP services.    This is the first attempt to reach the patient.  Referral deferred for f/u attempt per protocol.

## 2025-04-11 NOTE — TELEPHONE ENCOUNTER
Called the patient to comp-lete the triage process started today @ 10:00 am. Call went to .    Voice message left for patient to call back. Phone number and hours of business provided.     Referral Closed.  Triage will be completed when a return call is received.

## 2025-04-11 NOTE — TELEPHONE ENCOUNTER
"Additional Information   Negative: Is this related to a work injury?   Negative: Is this related to an MVA?   Negative: Are you currently recieving homecare services?    Background - Initial Assessment  Clinical complaint: ED visit on 04/10 due to Right Sided Lower Back Pain that started on Wednesday during his \"lunch break \" at work. No injury, no fall, no car accident recently. No previous hx of back pain. Patient states pain radiates down to his right leg all the way down to his toes. No pain in his upper body.  No numbness or tingling sensation. Pain is intermittent \"on and off\". Worse when bending down and with movements. Not seeing a spine specialist for this pain. Patient described it as aching.  Date of onset: Wednesday 04/09.  Frequency of pain: intermittent  Quality of pain: aching.    Protocols used: [facility] Protocol    "

## 2025-04-28 RX ORDER — MELOXICAM 15 MG/1
15 TABLET ORAL DAILY
Qty: 30 TABLET | Refills: 0 | Status: SHIPPED | OUTPATIENT
Start: 2025-04-28

## 2025-04-28 RX ORDER — MELOXICAM 15 MG/1
15 TABLET ORAL DAILY
Qty: 30 TABLET | Refills: 0 | Status: SHIPPED | OUTPATIENT
Start: 2025-04-28 | End: 2025-04-28 | Stop reason: SDUPTHER

## 2025-06-16 ENCOUNTER — OFFICE VISIT (OUTPATIENT)
Dept: OBGYN CLINIC | Facility: CLINIC | Age: 65
End: 2025-06-16
Payer: COMMERCIAL

## 2025-06-16 VITALS — BODY MASS INDEX: 36.08 KG/M2 | HEIGHT: 70 IN | WEIGHT: 252 LBS

## 2025-06-16 DIAGNOSIS — M17.12 PRIMARY OSTEOARTHRITIS OF LEFT KNEE: Primary | ICD-10-CM

## 2025-06-16 PROCEDURE — 20610 DRAIN/INJ JOINT/BURSA W/O US: CPT | Performed by: PHYSICIAN ASSISTANT

## 2025-06-16 PROCEDURE — 99213 OFFICE O/P EST LOW 20 MIN: CPT | Performed by: ORTHOPAEDIC SURGERY

## 2025-06-16 RX ORDER — BUPIVACAINE HYDROCHLORIDE 2.5 MG/ML
2 INJECTION, SOLUTION INFILTRATION; PERINEURAL
Status: COMPLETED | OUTPATIENT
Start: 2025-06-16 | End: 2025-06-16

## 2025-06-16 RX ORDER — TRIAMCINOLONE ACETONIDE 40 MG/ML
80 INJECTION, SUSPENSION INTRA-ARTICULAR; INTRAMUSCULAR
Status: COMPLETED | OUTPATIENT
Start: 2025-06-16 | End: 2025-06-16

## 2025-06-16 RX ADMIN — BUPIVACAINE HYDROCHLORIDE 2 ML: 2.5 INJECTION, SOLUTION INFILTRATION; PERINEURAL at 16:00

## 2025-06-16 RX ADMIN — TRIAMCINOLONE ACETONIDE 80 MG: 40 INJECTION, SUSPENSION INTRA-ARTICULAR; INTRAMUSCULAR at 16:00

## 2025-06-16 NOTE — PROGRESS NOTES
Name: Driss Hauser      : 1960      MRN: [id number]  Encounter Provider: Homero Diaz DO  Encounter Date: 2025   Encounter department: [facility]      :  Assessment & Plan  Primary osteoarthritis of left knee  Orders:    Injection Procedure Prior Authorization; Future   Physical exam, diagnostic imaging, and subjective history are all most consistent with the above diagnosis. The pathoanatomy and natural history of this diagnosis was explained to the patient in detail.   Patient was offered, accepted, and received a cortisone injection of the left knee today. Patient tolerated procedure well with no immediate complications. Post-injection protocols and expectations were discussed with the patient.   Visco authorization placed for left knee         Subjective:  Driss Hauser is a 64 y.o. male who presents today with recurrent left knee pain. Patient states that he had about 1 year of relief after the last round of injections. Patient is requesting repeat injection therapy. Patient has known osteoarthritis of the left knee.      The following portions of the patient's history were reviewed and updated as appropriate: allergies, current medications, past family history, past social history, past surgical history and problem list.      Social History     Socioeconomic History    Marital status: /Civil Union     Spouse name: Not on file    Number of children: Not on file    Years of education: Not on file    Highest education level: Not on file   Occupational History    Not on file   Tobacco Use    Smoking status: Never    Smokeless tobacco: Never   Substance and Sexual Activity    Alcohol use: Not Currently    Drug use: Never    Sexual activity: Not on file   Other Topics Concern    Not on file   Social History Narrative    Not on file     Social Drivers of Health     Financial Resource Strain: Not on file   Food Insecurity: Not on file   Transportation Needs:  "Not on file   Physical Activity: Not on file   Stress: Not on file   Social Connections: Not on file   Intimate Partner Violence: Not on file   Housing Stability: Not on file     Past Medical History[1]  Past Surgical History[2]  Allergies[3]  Medications Ordered Prior to Encounter[4]         Objective:    Review of Systems  Pertinent items are noted in HPI.  All other systems were reviewed and are negative.    Physical Exam  Cons: Appears well.  No apparent distress.  Psych: Alert. Oriented x3.  Mood and affect normal.  Eyes: PERRLA, EOMI  Resp: Normal effort.  No audible wheezing or stridor.  CV: Palpable pulse.  No discernable arrhythmia.  No LE edema.  Lymph:  No palpable cervical, axillary, or inguinal lymphadenopathy.  Skin: Warm.  No palpable masses.  No visible lesions.  Neuro: Normal muscle tone.  Normal and symmetric DTR's.    BMI:   Estimated body mass index is 36.16 kg/m² as calculated from the following:    Height as of this encounter: 5' 10\" (1.778 m).    Weight as of this encounter: 114 kg (252 lb).  No results found for: \"HGBA1C\"      Left Knee Exam     Tenderness   The patient is experiencing tenderness in the lateral joint line, medial joint line and patella.    Range of Motion   The patient has normal left knee ROM.    Other   Pulse: present  Swelling: mild  Effusion: no effusion present    Comments:  +crepitus            Procedures  Large joint arthrocentesis: L knee    Performed by: Ruth Ann Oliva PA-C  Authorized by: Homero Diaz DO    Universal Protocol:  Consent: Verbal consent obtained  Risks and benefits: risks, benefits and alternatives were discussed  Consent given by: patient  Patient understanding: patient states understanding of the procedure being performed  Patient identity confirmed: verbally with patient  Supporting Documentation  Indications: pain     Is this a Visco injection? NoProcedure Details  Location: knee - L knee  Preparation: Patient was prepped and draped in the " "usual sterile fashion  Needle size: 22 G  Approach: anterolateral  Medications administered: 2 mL bupivacaine 0.25 %; 80 mg triamcinolone acetonide 40 mg/mL    Patient tolerance: patient tolerated the procedure well with no immediate complications  Dressing:  Sterile dressing applied        -          Portions of the record may have been created with voice recognition software.  Occasional wrong word or \"sound a like\" substitutions may have occurred due to the inherent limitations of voice recognition software.  Read the chart carefully and recognize, using context, where substitutions have occurred.        [1]   Past Medical History:  Diagnosis Date    Diabetes mellitus (HCC)     Hypertension    [2] No past surgical history on file.  [3] No Known Allergies  [4]   Current Outpatient Medications on File Prior to Visit   Medication Sig Dispense Refill    meloxicam (MOBIC) 15 mg tablet Take 1 tablet (15 mg total) by mouth daily 30 tablet 0    amLODIPine (NORVASC) 2.5 mg tablet Take by mouth      atorvastatin (LIPITOR) 10 mg tablet Take by mouth      clotrimazole-betamethasone (LOTRISONE) 1-0.05 % cream APPLY 1 APPLICATION EXTERNALLY TWICE A DAY FOR 90 DAYS      metFORMIN (GLUCOPHAGE) 1000 MG tablet TAKE 1 TABLET WITH A MEAL BY MOUTH TWICE A DAY      methylPREDNISolone 4 MG tablet therapy pack Use as directed on package (Patient not taking: Reported on 11/29/2023) 1 each 0    metoprolol succinate (TOPROL-XL) 100 mg 24 hr tablet Take 100 mg by mouth daily      NIFEdipine (ADALAT CC) 90 mg 24 hr tablet TAKE 1 TABLET BY MOUTH EVERY DAY ON EMPTY STOMACH FOR 90 DAYS      ramipril (ALTACE) 1.25 mg capsule Take by mouth in the morning      Rybelsus 7 MG tablet Take 7 mg by mouth daily before breakfast      triamcinolone (KENALOG) 0.1 % ointment Apply twice a day for one month 454 g 0    valsartan-hydrochlorothiazide (DIOVAN-HCT) 320-25 MG per tablet Take 1 tablet by mouth daily       No current facility-administered " medications on file prior to visit.

## 2025-06-16 NOTE — ASSESSMENT & PLAN NOTE
Orders:    Injection Procedure Prior Authorization; Future   Physical exam, diagnostic imaging, and subjective history are all most consistent with the above diagnosis. The pathoanatomy and natural history of this diagnosis was explained to the patient in detail.   Patient was offered, accepted, and received a cortisone injection of the left knee today. Patient tolerated procedure well with no immediate complications. Post-injection protocols and expectations were discussed with the patient.   Visco authorization placed for left knee

## 2025-07-02 ENCOUNTER — PROCEDURE VISIT (OUTPATIENT)
Dept: OBGYN CLINIC | Facility: CLINIC | Age: 65
End: 2025-07-02
Payer: COMMERCIAL

## 2025-07-02 DIAGNOSIS — M17.12 PRIMARY OSTEOARTHRITIS OF LEFT KNEE: Primary | ICD-10-CM

## 2025-07-02 PROCEDURE — 20610 DRAIN/INJ JOINT/BURSA W/O US: CPT | Performed by: PHYSICIAN ASSISTANT

## 2025-07-09 ENCOUNTER — PROCEDURE VISIT (OUTPATIENT)
Dept: OBGYN CLINIC | Facility: CLINIC | Age: 65
End: 2025-07-09
Payer: COMMERCIAL

## 2025-07-09 VITALS — HEIGHT: 70 IN | BODY MASS INDEX: 36.08 KG/M2 | WEIGHT: 252 LBS

## 2025-07-09 DIAGNOSIS — M17.12 PRIMARY OSTEOARTHRITIS OF LEFT KNEE: Primary | ICD-10-CM

## 2025-07-09 PROCEDURE — 20610 DRAIN/INJ JOINT/BURSA W/O US: CPT | Performed by: PHYSICIAN ASSISTANT

## 2025-07-09 NOTE — PROGRESS NOTES
Name: Driss Hauser      : 1960      MRN: [id number]  Encounter Provider: Homero Diaz DO  Encounter Date: 2025   Encounter department: [facility]  :  Assessment & Plan  Primary osteoarthritis of left knee               Patient is here for his 2nd injection of Orthovisc into the Left knee.     Physical exam of the knee shows no effusion, no ecchymosis.    Large joint arthrocentesis: L knee    Performed by: Ruth Ann Oliva PA-C  Authorized by: Homero Diaz DO    Universal Protocol:  Consent: Verbal consent obtained  Risks and benefits: risks, benefits and alternatives were discussed  Consent given by: patient  Patient understanding: patient states understanding of the procedure being performed  Patient identity confirmed: verbally with patient  Supporting Documentation  Indications: pain     Is this a Visco injection? Yes  Non-Pharmacologic Treatments Attempted: Home Exercise  Pharmacologic Treatments Attempted: cortisone  Pain Score: 6Procedure Details  Location: knee - L knee  Preparation: Patient was prepped and draped in the usual sterile fashion  Needle size: 22 G  Approach: anterolateral  Medications administered: 30 mg sodium hyaluronate 30 mg/2 mL    Patient tolerance: patient tolerated the procedure well with no immediate complications  Dressing:  Sterile dressing applied          Patient tolerated procedure follow up 1 week for 3rd injection

## 2025-07-16 ENCOUNTER — PROCEDURE VISIT (OUTPATIENT)
Dept: OBGYN CLINIC | Facility: CLINIC | Age: 65
End: 2025-07-16
Payer: COMMERCIAL

## 2025-07-16 VITALS — BODY MASS INDEX: 36.08 KG/M2 | HEIGHT: 70 IN | WEIGHT: 252 LBS

## 2025-07-16 DIAGNOSIS — M17.12 PRIMARY OSTEOARTHRITIS OF LEFT KNEE: Primary | ICD-10-CM

## 2025-07-16 PROCEDURE — 20610 DRAIN/INJ JOINT/BURSA W/O US: CPT | Performed by: ORTHOPAEDIC SURGERY

## 2025-07-16 NOTE — PROGRESS NOTES
Name: Driss Hauser      : 1960      MRN: [id number]  Encounter Provider: Homero Diaz DO  Encounter Date: 2025   Encounter department: [facility]  :  Assessment & Plan  Primary osteoarthritis of left knee  Orthovisc #3/3 25         Patient is here for his 3rd injection of Orthovisc into the Left knee.     Physical exam of the knee shows no effusion, no ecchymosis.    Large joint arthrocentesis: L knee    Performed by: Homero Diaz DO  Authorized by: Homero Diaz DO    Universal Protocol:  Consent: Verbal consent obtained  Risks and benefits: risks, benefits and alternatives were discussed  Consent given by: patient  Patient understanding: patient states understanding of the procedure being performed  Patient identity confirmed: verbally with patient  Supporting Documentation  Indications: pain     Is this a Visco injection? Yes  Non-Pharmacologic Treatments Attempted: Home Exercise  Pharmacologic Treatments Attempted: cortisone  Pain Score: 6Procedure Details  Location: knee - L knee  Preparation: Patient was prepped and draped in the usual sterile fashion  Needle size: 22 G  Approach: anterolateral  Medications administered: 30 mg sodium hyaluronate 30 mg/2 mL    Patient tolerance: patient tolerated the procedure well with no immediate complications  Dressing:  Sterile dressing applied          Patient tolerated procedure follow up as needed  May follow up after 25 for CSI if desired

## 2025-08-15 ENCOUNTER — TELEPHONE (OUTPATIENT)
Dept: GASTROENTEROLOGY | Facility: CLINIC | Age: 65
End: 2025-08-15